# Patient Record
(demographics unavailable — no encounter records)

---

## 2018-03-19 NOTE — ER
Date of service - 3-19-18

 

HISTORY OF PRESENT ILLNESS:  An 84-year-old lady who comes in with her  
and son with

complaints of weakness and shortness of breath.  The patient states she has not 
felt good

all month, she just feels sick.  She feels very tired.  She does not think she 
has been

coughing up any kind of phlegm.  She denies any problems with wheezing and she 
does not

think she has been running a fever.  No other family members are currently sick.

 

PHYSICAL EXAMINATION:

GENERAL APPEARANCE:  The patient is awake and alert.  She is in mild 
respiratory distress

initially.  This seems to happen with any kind of movement or talking.  VITAL 
SIGNS:

Reviewed.  Initial blood pressure is 130/52.  O2 sats are in the upper 70s on 
room air.  The

patient tells us that she normally runs in the high 80s at home.  She uses 
oxygen at night

only.  Pulse is 127.  Respirations 24.  HEENT:  Ears, TMs are dull.  Nares are 
patent.  Oral

mucous membranes are dry.  Tonsils are not enlarged or injected.  Pharynx not 
inflamed.

Neck:  Supple.  LUNGS:  Reveal reduced air exchange in both bases.  I do not 
hear any

wheezes or rales initially.  CARDIAC:  Heart sounds distinct without murmurs.  
ABDOMEN:

Soft, protuberant, nontender to palpation.  Bowel sounds are present.  SKIN:  
Warm and dry.

EXTREMITIES:  There is no lower extremity edema.

 

LABORATORY DATA:  Labs include a CBC showing a slightly elevated white count of 
11.7.  CMP

shows a BUN of 21, creatinine 1.02, GFR is 52, glucose 128, nonfasting.  BNP is 
3322.

 

INITIAL TREATMENT:  The patient was placed on oxygen, which did bring her sats 
up.  She was

mouth breathing, therefore, we put her on a non-rebreather mask and her sats 
jumped to 100%

on 5 L.  We weaned her down and she still maintained in the low 90s on 2 L.

 

INITIAL TREATMENT PLAN:  The patient is given a bolus of 500 mL of normal 
saline.  She was

given a DuoNeb treatment as well and she stated that she felt much better after 
this had

been done.  Chest x-ray was obtained.  I can see some mild pulmonary 
congestion.  X-ray

report is negative for any acute findings.

 

DIAGNOSES:

1. Congestive heart failure.

2. Weakness.

3. Mild dehydration.

 

TREATMENT PLAN:  The patient will be admitted for observation purposes.  She is 
here with

her  and son, they agree to do this.  The patient initially is reluctant 
to be

admitted, but does agree to do this.  She will be admitted to observation.

 

 

CRS/MODL

DD:  03/19/2018 12:03:18

DT:  03/19/2018 18:49:15

Job #:  [id number]

MTDD

## 2018-03-19 NOTE — CR
DATE OF SERVICE:  03/19/18

CLINICAL DATA:  SOB



PORTABLE CHEST:



Comparison is made to a prior exam dated 02/26/18.



The heart size is stable.  There is calcification of the aortic arch.  



There are mild interstitial changes throughout both lungs.  There are mild 
atelectatic changes in both lower lungs.  



No pneumothorax.  No pleural effusions.  No areas of consolidation.



[id number]
MTDD

## 2018-03-20 NOTE — PN
DATE OF VISIT:  03/20/2018

 

SUBJECTIVE:  The patient was admitted yesterday for CHF with mild dehydration and weakness.

She has been doing better over the course of her stay.  She was given her blood pressure

medications last evening and again this morning, which is closer than 24 hours apart and her

blood pressure this morning has dropped.  Recent blood pressure taken this morning is

105/59, pulse is 131, O2 sats are 92%.  The patient states that she feels fine.  She did not

need a neb treatment or inhaler during the night to help with her breathing.  She slept

well.  Appetite has been good.

 

OBJECTIVE:  GENERAL APPEARANCE:  The patient is awake and alert.  No obvious distress.

VITAL SIGNS:  Reviewed.  Again, blood pressure 105/59, pulse is around 130.  She is

afebrile.  LUNG:  The patient has end-expiratory wheezes in both upper lobes, but just

minimal and mildly reduced air exchange in both bases.  I do not hear any rales or rhonchi

at this time.  SKIN:  Warm and dry.

 

TREATMENT PLAN:  The patient will be watched here for one more day.  I am little concerned

about her blood pressure being low.  She got her medications too close together with the

dosing in process and this puts her blood pressure down.  She also will be given Lasix just

once today, which she already has received this morning, 20 mg IV.

 

I was contacted an hour later.  The patient's blood pressure had dropped further to 70/40

and she was slightly dizzy.  Normal saline was started at 250 an hour.  We will give her 1 L

to help compensate for the low blood pressure and the patient is instructed to stay in bed

except for reducing the commode when she needs to do this with assistance.  The patient

states that she feels fine, just a little dizzy.  She is not having any problems breathing.

 

 

CRS/MODL

DD:  03/20/2018 10:23:50

DT:  03/20/2018 12:09:42

Job #:  [id number]

## 2018-03-20 NOTE — HP
HISTORY OF PRESENT ILLNESS:  This patient is being admitted for observation due to an

exacerbation of CHF, which is also causing weakness and she has mild dehydration symptoms.

The patient was seen in the emergency room with complaints of not feeling well for at least

3 weeks, feeling weak and sick.  She is tired.  Lab work revealed a white count of 11.7.

Kidney function was okay with a creatinine of 1.02, BUN 21.  BNP is 3,332.  UA was also

obtained through the emergency room, showing a small amount of proteinuria, 75 to 100 wbc's,

and a few bacteria.  The patient's vital signs have been okay.  Blood pressure initially

130/52, pulse 108, she is afebrile.  O2 saturations were initially in the low 70s, but with

oxygen applied, she has recovered into the upper 90s.  The oxygen was then turned down to 2

L, and she maintained a saturation of 92% with this.

 

PAST MEDICAL HISTORY:

1. Hypertension.

2. History of hiatal hernia.

3. History of seasonal allergies.

4. COPD.

 

PAST SURGICAL HISTORY:

1. Carotid endarterectomy.

2. Ankle fracture in the past, which did require surgery.

 

SOCIAL HISTORY:  The patient is .  She is still living at home with her .  She

is a former smoker, telling me that she quit years ago, but the damage was to some degree

done.

 

OBJECTIVE:  GENERAL APPEARANCE:  While seeing the patient a few hours after being admitted

to the hospital, she states that she is definitely feeling better.  She has had Lasix 20 mg

IV.  She has voided 1200 mL out using a bedside commode, which she has been doing well with.

Her O2 saturations are now stable, even with using the bedside commode with 2 L of oxygen

per nasal cannula.  LUNGS:  Reveal ongoing reduction of air exchange in both bases.  I do

not hear any rales, wheezes, or rhonchi at this time.  SKIN:  Warm and dry.  CARDIAC:  Heart

sounds distinct without murmurs.  EXTREMITIES:  The patient does not have any lower

extremity edema.

 

ASSESSMENT AND PLAN:  She will be here for observation at least 1 day.  She will be given

Lasix 20 mg b.i.d. starting out in IV fashion.  We will see how she does tomorrow and maybe

can switch her over to p.o. at that time.  Her IV fluids are running at 30 mL an hour.  We

did get an EKG and troponin level

today, which were also negative.  If the patient does continue to improve, there is a

reasonable chance she will be able to be discharged tomorrow.

 

 

TOLU/MODL

DD:  03/19/2018 17:59:58

DT:  03/20/2018 01:20:56

Job #:  [id number]

## 2018-03-21 NOTE — PCM.PN
- General Info


Date of Service: 03/21/18


Subjective Update: 


Pt claims that she has been feeling better today. She feels more energetic 

today. Has not been short of breath. No fever or chills. No cough or chest 

tightness. Tolerating diet well.She is on oxygen 2-4 litres/min.





Functional Status: Reports: Pain Controlled, Tolerating Diet, Ambulating, 

Urinating, Incentive Spirometry





- Review of Systems


General: Denies: Fever, Weakness, Fatigue, Malaise


HEENT: Denies: Sinus Congestion, Sore Throat, Rhinitis


Pulmonary: Denies: Shortness of Breath, Pleuritic Chest Pain, Cough, Sputum, 

Wheezing


Cardiovascular: Denies: Chest Pain, Lightheadedness


Gastrointestinal: Denies: Abdominal Pain, Nausea, Vomiting


Genitourinary: Denies: Dysuria, Frequency


Musculoskeletal: Denies: Joint Pain, Joint Swelling


Skin: Denies: Bruising, Pruritis, Rash


Neurological: Denies: Confusion, Headache





- Patient Data


Vitals - Most Recent: 


 Last Vital Signs











Temp  96.8 F   03/21/18 04:00


 


Pulse  118 H  03/21/18 04:00


 


Resp  18   03/21/18 04:00


 


BP  106/65   03/21/18 08:48


 


Pulse Ox  85 L  03/21/18 04:00











Weight - Most Recent: 74.843 kg


I&O - Last 24 Hours: 


 Intake & Output











 03/20/18 03/21/18 03/21/18





 22:59 06:59 14:59


 


Intake Total  100 


 


Output Total  350 


 


Balance  -250 











Lab Results Last 24 Hours: 


 Laboratory Results - last 24 hr











  03/21/18 Range/Units





  07:15 


 


Sodium  138  (136-145)  mmol/L


 


Potassium  4.5  (3.5-5.1)  mmol/L


 


Chloride  100  ()  mmol/L


 


Carbon Dioxide  26.8  (21.0-32.0)  mmol/L


 


Anion Gap  15.7 H  (5.0-15.0)  mmol/L


 


BUN  25  (8-26)  mg/dL


 


Creatinine  1.08 H  (0.55-1.02)  mg/dL


 


Est Cr Clr Drug Dosing  36.30  mL/min


 


Estimated GFR (MDRD)  48 L  (>60)  MLS/MIN


 


BUN/Creatinine Ratio  23.1  (6-25)  


 


Glucose  203 H D  ()  mg/dL


 


Calcium  8.5  (8.5-10.1)  mg/dL


 


Total Bilirubin  0.6  D  (0.0-1.0)  mg/dL


 


AST  14 L  (15-37)  U/L


 


ALT  13  (12-78)  U/L


 


Alkaline Phosphatase  74  ()  U/L


 


B-Natriuretic Peptide  809 H D  (0-450)  pg/mL


 


Total Protein  6.4  (6.4-8.2)  g/dL


 


Albumin  2.5 L  (3.4-5.0)  g/dL


 


Globulin  3.9  (2.2-4.2)  g/dL


 


Albumin/Globulin Ratio  0.6 L  (0.8-2.0)  











Donn Results Last 24 Hours: 


 Microbiology











 03/19/18 12:42 MRSA Surveillance Culture - Final





 Nasal, Unspecified    NO MRSA ISOLATED











Med Orders - Current: 


 Current Medications





Albuterol/Ipratropium (Duoneb 3.0-0.5 Mg/3 Ml)  3 ml NEB Q6H PRN


   PRN Reason: Shortness of Breath


   Stop: 03/21/18 12:00


   Last Admin: 03/21/18 07:50 Dose:  3 ml


Enalapril Maleate (Vasotec)  2.5 mg PO DAILY [facility]


   Last Admin: 03/21/18 08:48 Dose:  2.5 mg


Furosemide (Lasix)  20 mg PO DAILY [facility]


   Last Admin: 03/21/18 08:44 Dose:  Not Given


Sodium Chloride (Normal Saline)  1,000 mls @ 30 mls/hr IV ASDIRECTED [facility]


   Last Admin: 03/19/18 23:29 Dose:  30 mls/hr


Montelukast Sodium (Singulair)  10 mg PO QPM [facility]


Non-Formulary Medication (Acetaminophen [Tylenol Arthritis])  2 tab PO BID [facility]


   Last Admin: 03/21/18 07:48 Dose:  Not Given


Non-Formulary Medication (Aspirin [Melchor Chewable Aspirin])  81 mg PO DAILY [facility]


   Last Admin: 03/21/18 07:47 Dose:  81 mg


Non-Formulary Medication (Atorvastatin Calcium [Atorvastatin Calcium])  10 mg 

PO QPM [facility]


   Last Admin: 03/20/18 19:40 Dose:  Not Given


Non-Formulary Medication (Fluticasone Propionate [Flonase Allergy Relief])  1 - 

2 spray NASBOTH DAILY [facility]


   Stop: 03/23/18 08:01


   Last Admin: 03/21/18 07:48 Dose:  Not Given


Non-Formulary Medication (Fluticasone/Salmeterol [Advair 250-50 Diskus])  1 

puff INH BID [facility]


   Last Admin: 03/21/18 07:48 Dose:  1 puff


Atenolol 25mg Tabs  1 each PO DAILY [facility]


   Last Admin: 03/21/18 08:48 Dose:  1 each


Non-Formulary Medication (Albuterol [Ventolin Hfa])  1 - 2 puff INH Q4HR PRN


   PRN Reason: Shortness of Breath


   Stop: 03/21/18 18:00


Non-Formulary Medication (Lidocaine 5% [Lidoderm 5%])  1 patch TOP DAILY [facility]


Ondansetron HCl (Zofran Odt)  4 mg PO Q6H PRN


   PRN Reason: Nausea/Vomiting


Pramipexole Dihydrochloride (Mirapex)  0.125 mg PO QPM [facility]


   Last Admin: 03/21/18 01:17 Dose:  Not Given


Ranitidine HCl (Zantac)  300 mg PO BID [facility]


   Last Admin: 03/21/18 07:47 Dose:  300 mg


Tiotropium Bromide (Spiriva Handihaler)  18 mcg INH BEDTIME [facility]


   Last Admin: 03/20/18 19:39 Dose:  1 tab





Discontinued Medications





Albuterol/Ipratropium (Duoneb 3.0-0.5 Mg/3 Ml)  3 ml NEB ONETIME ONE


   Stop: 03/19/18 09:46


   Last Admin: 03/19/18 14:20 Dose:  Not Given


Furosemide (Lasix)  20 mg IVPUSH BID [facility]


   Last Admin: 03/19/18 12:30 Dose:  20 mg


Furosemide (Lasix)  20 mg IVPUSH BID [facility]


   Stop: 03/20/18 12:00


Furosemide (Lasix) Confirm Administered Dose 20 mg .ROUTE .STK-MED ONE


   Stop: 03/19/18 12:25


   Last Admin: 03/19/18 12:30 Dose:  Not Given


Furosemide (Lasix)  20 mg IVPUSH BID@0800,1400 [facility]


   Last Admin: 03/20/18 14:54 Dose:  Not Given


Sodium Chloride (Normal Saline)  500 mls @ 500 mls/hr IV .BOLUS ONE


   Stop: 03/19/18 10:39


   Last Admin: 03/19/18 09:42 Dose:  500 mls/hr


Methylprednisolone Sodium Succinate (Solu-Medrol)  80 mg IVPUSH ONETIME ONE


   Stop: 03/20/18 18:44


   Last Admin: 03/20/18 19:27 Dose:  80 mg


Non-Formulary Medication (Amlodipine Besylate/Benazepril [Amlodipine-Benazepril 

5-20 Mg])  1 cap PO DAILY [facility]


   Last Admin: 03/20/18 08:54 Dose:  1 cap


Non-Formulary Medication (Ranitidine Hcl [Zantac])  300 mg PO DAILY [facility]


Ranitidine HCl (Zantac)  300 mg PO DAILY [facility]


   Last Admin: 03/20/18 08:54 Dose:  300 mg











- Exam


Quality Assessment: Supplemental Oxygen


General: Alert, Oriented


HEENT: Pupils Equal, Pupils Reactive, EOMI, Mucous Membr. Moist/Pink


Neck: Supple


Lungs: Clear to Auscultation, Normal Respiratory Effort


Cardiovascular: Regular Rate, Regular Rhythm


GI/Abdominal Exam: Normal Bowel Sounds, Soft, Non-Tender, No Organomegaly, No 

Distention, No Abnormal Bruit, No Mass, Pelvis Stable


Extremities: Normal Inspection, Normal Range of Motion, Non-Tender, No Pedal 

Edema, Normal Capillary Refill


Skin: Warm, Intact





- Problem List & Annotations


(1) Congestive heart failure


SNOMED Code(s): 61630843


   Code(s): I50.9 - HEART FAILURE, UNSPECIFIED   Status: Acute   Current Visit: 

Yes   Onset Date: ~03/19/18   Annotation/Comment:: 3/19/18 - 1. Congestive 

heart failure, 2. Weakness, 3. Mild dehydration   





- Problem List Review


Problem List Initiated/Reviewed/Updated: Yes





- My Orders


Last 24 Hours: 


CHF improving








- Assessment


Assessment:: 


Pt has been clinically feeling better. No weakness or shortness of breath. 

Tolerating diet. Also her BNP is down form 3300 to 800s today.No lower 

extremity edema and lung are clear to auscultation. Her Morning BP is in 106/

65mmhg, will hold of her lasix. Pt will need ECHO cardiogram scheduled. Will 

try to titrate oxygen down and discontinue during day time and see, if she is 

able to do her ADLS. PT does uses oxygen at bedtime which we will continue.

## 2018-03-21 NOTE — PCM.DCSUM1
**Discharge Summary





- Hospital Course


Free Text/Narrative:: 


Pt was admitted with COPD exacerbation with new onset CHF. Pt's BNP was 3300. 

She was started on vasotec 2.5mg daily and also on lasix 20mg BID. Her COPD was 

treated with solumedrol. Pt had some improvement with her shortness of breath 

by day 1, but still having exertional dyspnea. Pt was closely monitored. She 

SpO2 did drop with day time  exertion. On day 2 Pt was feeling better. More 

energetic. Tolerating diet well. Her breathing had improved. Also her BNP was 

down from 3330 to 809. Renal function were stable. Clinically lungs were clear 

and also had no lower extremity edema. Pt did receive solumedrol 125mg IV and 

also pulmicort nebulizer. By the end of the day, pt was able to walk about good 

100yards before she got dyspnec, which had improved. Pt does not move more than 

that distance at home at a time. Pt appears to be at her baseline.





Will have her ECHO done next wednesday. Will continue home meds. I have 

discontinue her lotrel and kept her on vasotec 2.5mg. Also started her on 

tapering dose of prednsione.


Pt to followup with Dr. Deras on 3/29/18.





Brief History: Pt was admitted with SOB and was daignosed with COPD 

exacerbation with new onset CHF. Kindly see H&P for details





- Discharge Data


Discharge Date: 03/21/18


Discharge Disposition: Home, Self-Care 01


Condition: Good





- Discharge Diagnosis/Problem(s)


(1) Congestive heart failure


SNOMED Code(s): 04796252


   ICD Code: I50.9 - HEART FAILURE, UNSPECIFIED   Status: Acute   Current Visit

: Yes   Onset Date: ~03/19/18   Problem Details: 3/19/18 - 1. Congestive heart 

failure, 2. Weakness, 3. Mild dehydration   





- Patient Instructions


Diet: Low Sodium


Fluid Restriction: 1500 mL


Activity: As Tolerated


Showering/Bathing: May Shower





- Discharge Plan


Home Medications: 


 Home Meds





Acetaminophen [Tylenol Arthritis] 2 tab PO BID 01/14/17 [History]


Albuterol [Ventolin HFA] 1 - 2 puff INH Q4HR PRN 01/14/17 [History]


Aspirin [Melchor Chewable Aspirin] 81 mg PO DAILY 01/14/17 [History]


Atenolol [Tenormin] 50 mg PO DAILY 01/14/17 [History]


Fluticasone Propionate [Flonase Allergy Relief] 1 - 2 spray NASBOTH DAILY 01/14/ 17 [History]


Fluticasone/Salmeterol [Advair 250-50 Diskus] 1 puff INH BID 01/14/17 [History]


Lidocaine 5% [Lidoderm 5%] 1 patch TOP DAILY 01/14/17 [History]


Montelukast Sodium [Singulair] 10 mg PO QPM 01/14/17 [History]


Pramipexole Di-HCl [Pramipexole Dihydrochloride] 0.125 mg PO QPM 01/14/17 [

History]


Tiotropium Bromide [Spiriva] 1 inh INH DAILY 01/14/17 [History]


atorvaSTATin Calcium [Atorvastatin Calcium] 10 mg PO QPM 01/14/17 [History]


Ranitidine HCl [Zantac] 300 mg PO DAILY 03/19/18 [History]


Fluticasone/Salmeterol [Advair 250-50 Diskus] 1 puff INH BID 03/21/18 [Rx]


Ondansetron [Zofran ODT] 4 mg PO Q6H PRN  tab.dis 03/21/18 [Rx]


Tiotropium [Spiriva HandiHaler] 18 mcg INH BEDTIME  cap 03/21/18 [Rx]








Patient Handouts:  Heart-Healthy Eating Plan, Easy-to-Read, Chronic Obstructive 

Pulmonary Disease, Easy-to-Read, Home Oxygen Use, Adult, Low-Sodium Eating Plan

, Heart Failure, Easy-to-Read


Forms:  Take Home DC Nutrition Plan


Referrals: 


Cris Monte, NP [Primary Care Provider] - 





- Discharge Summary/Plan Comment


DC Time >30 min.: Yes


Discharge Summary/Plan Comment: 


Will have her ECHO done next wednesday. Will continue home meds. I have 

discontinue her lotrel and kept her on vasotec 2.5mg. Also started her on 

tapering dose of prednsione.


Pt to followup with Dr. Deras on 3/29/18.











- General Info


Date of Service: 03/21/18


Functional Status: Reports: Tolerating Diet, Ambulating, Urinating, Incentive 

Spirometry





- Review of Systems


General: Denies: Fever, Weakness


HEENT: Denies: Rhinitis, Visual Changes


Pulmonary: Denies: Shortness of Breath, Pleuritic Chest Pain, Cough, Sputum, 

Hemoptysis


Cardiovascular: Denies: Chest Pain, Lightheadedness


Gastrointestinal: Denies: Decreased Appetite, Nausea, Vomiting


Genitourinary: Denies: Dysuria, Frequency


Musculoskeletal: Denies: Joint Pain, Joint Swelling


Skin: Denies: Pruritis, Rash


Neurological: Denies: Confusion, Headache





- Patient Data


Vitals - Most Recent: 


 Last Vital Signs











Temp  98.2 F   03/21/18 12:00


 


Pulse  98   03/21/18 13:00


 


Resp  18   03/21/18 12:00


 


BP  98/61   03/21/18 13:00


 


Pulse Ox  94 L  03/21/18 12:00











Weight - Most Recent: 74.843 kg


I&O - Last 24 hours: 


 Intake & Output











 03/21/18 03/21/18 03/21/18





 06:59 14:59 22:59


 


Intake Total 100  


 


Output Total 350  


 


Balance -250  











Lab Results - Last 24 hrs: 


 Laboratory Results - last 24 hr











  03/21/18 03/21/18 Range/Units





  07:15 07:15 


 


WBC   4.8  D  (4.0-11.0)  K/uL


 


RBC   3.90  (3.80-5.80)  M/uL


 


Hgb   11.3 L  (11.5-16.5)  g/dL


 


Hct   36.2 L  (37.0-47.0)  %


 


MCV   93  (76-96)  fL


 


MCH   29.0  (27.0-32.0)  pg


 


MCHC   31.2  (31.0-35.0)  g/dL


 


RDW   15.5  (11.0-16.0)  %


 


Plt Count   75 L D  (150-500)  K/uL


 


MPV   11.2 H  (6.0-10.0)  fL


 


Neut % (Auto)   90.0 H  (45.0-70.0)  %


 


Lymph % (Auto)   7.7 L  (20.0-40.0)  %


 


Mono % (Auto)   2.1 L  (3.0-10.0)  %


 


Eos % (Auto)   0.2 L  (1.0-5.0)  %


 


Baso % (Auto)   0.0  (0.0-0.5)  %


 


Neut # (Auto)   4.30  (2.00-7.50)  K/uL


 


Lymph # (Auto)   0.37 L  (1.50-4.00)  K/uL


 


Mono # (Auto)   0.10 L  (0.20-0.80)  K/uL


 


Eos # (Auto)   0.01 L  (0.04-0.40)  K/uL


 


Baso # (Auto)   0.00 L  (0.02-0.10)  K/uL


 


Sodium  138   (136-145)  mmol/L


 


Potassium  4.5   (3.5-5.1)  mmol/L


 


Chloride  100   ()  mmol/L


 


Carbon Dioxide  26.8   (21.0-32.0)  mmol/L


 


Anion Gap  15.7 H   (5.0-15.0)  mmol/L


 


BUN  25   (8-26)  mg/dL


 


Creatinine  1.08 H   (0.55-1.02)  mg/dL


 


Est Cr Clr Drug Dosing  36.30   mL/min


 


Estimated GFR (MDRD)  48 L   (>60)  MLS/MIN


 


BUN/Creatinine Ratio  23.1   (6-25)  


 


Glucose  203 H D   ()  mg/dL


 


Calcium  8.5   (8.5-10.1)  mg/dL


 


Total Bilirubin  0.6  D   (0.0-1.0)  mg/dL


 


AST  14 L   (15-37)  U/L


 


ALT  13   (12-78)  U/L


 


Alkaline Phosphatase  74   ()  U/L


 


B-Natriuretic Peptide  809 H D   (0-450)  pg/mL


 


Total Protein  6.4   (6.4-8.2)  g/dL


 


Albumin  2.5 L   (3.4-5.0)  g/dL


 


Globulin  3.9   (2.2-4.2)  g/dL


 


Albumin/Globulin Ratio  0.6 L   (0.8-2.0)  











Med Orders - Current: 


 Current Medications





Enalapril Maleate (Vasotec)  2.5 mg PO DAILY [facility]


   Last Admin: 03/21/18 08:48 Dose:  2.5 mg


Furosemide (Lasix)  20 mg PO DAILY [facility]


   Last Admin: 03/21/18 08:44 Dose:  Not Given


Sodium Chloride (Normal Saline)  1,000 mls @ 30 mls/hr IV ASDIRECTED [facility]


   Last Admin: 03/19/18 23:29 Dose:  30 mls/hr


Montelukast Sodium (Singulair)  10 mg PO QPM [facility]


Non-Formulary Medication (Acetaminophen [Tylenol Arthritis])  2 tab PO BID [facility]


   Last Admin: 03/21/18 07:48 Dose:  Not Given


Non-Formulary Medication (Aspirin [Melchor Chewable Aspirin])  81 mg PO DAILY [facility]


   Last Admin: 03/21/18 07:47 Dose:  81 mg


Non-Formulary Medication (Atorvastatin Calcium [Atorvastatin Calcium])  10 mg 

PO QPM [facility]


   Last Admin: 03/20/18 19:40 Dose:  Not Given


Non-Formulary Medication (Fluticasone Propionate [Flonase Allergy Relief])  1 - 

2 spray NASBOTH DAILY [facility]


   Stop: 03/23/18 08:01


   Last Admin: 03/21/18 07:48 Dose:  Not Given


Non-Formulary Medication (Fluticasone/Salmeterol [Advair 250-50 Diskus])  1 

puff INH BID [facility]


   Last Admin: 03/21/18 07:48 Dose:  1 puff


Atenolol 25mg Tabs  1 each PO DAILY [facility]


   Last Admin: 03/21/18 08:48 Dose:  1 each


Non-Formulary Medication (Albuterol [Ventolin Hfa])  1 - 2 puff INH Q4HR PRN


   PRN Reason: Shortness of Breath


   Stop: 03/21/18 18:00


Non-Formulary Medication (Lidocaine 5% [Lidoderm 5%])  1 patch TOP DAILY [facility]


Ondansetron HCl (Zofran Odt)  4 mg PO Q6H PRN


   PRN Reason: Nausea/Vomiting


Pramipexole Dihydrochloride (Mirapex)  0.125 mg PO QPM [facility]


   Last Admin: 03/21/18 01:17 Dose:  Not Given


Ranitidine HCl (Zantac)  300 mg PO BID [facility]


   Last Admin: 03/21/18 07:47 Dose:  300 mg


Tiotropium Bromide (Spiriva Handihaler)  18 mcg INH BEDTIME [facility]


   Last Admin: 03/20/18 19:39 Dose:  1 tab





Discontinued Medications





Albuterol/Ipratropium (Duoneb 3.0-0.5 Mg/3 Ml)  3 ml NEB ONETIME ONE


   Stop: 03/19/18 09:46


   Last Admin: 03/19/18 14:20 Dose:  Not Given


Albuterol/Ipratropium (Duoneb 3.0-0.5 Mg/3 Ml)  3 ml NEB Q6H PRN


   PRN Reason: Shortness of Breath


   Stop: 03/21/18 12:00


   Last Admin: 03/21/18 07:50 Dose:  3 ml


Budesonide (Pulmicort)  0.5 mg NEB ONETIME ONE


   Stop: 03/21/18 14:05


   Last Admin: 03/21/18 14:15 Dose:  0.5 mg


Furosemide (Lasix)  20 mg IVPUSH BID [facility]


   Last Admin: 03/19/18 12:30 Dose:  20 mg


Furosemide (Lasix)  20 mg IVPUSH BID [facility]


   Stop: 03/20/18 12:00


Furosemide (Lasix) Confirm Administered Dose 20 mg .ROUTE .STK-MED ONE


   Stop: 03/19/18 12:25


   Last Admin: 03/19/18 12:30 Dose:  Not Given


Furosemide (Lasix)  20 mg IVPUSH BID@0800,1400 [facility]


   Last Admin: 03/20/18 14:54 Dose:  Not Given


Sodium Chloride (Normal Saline)  500 mls @ 500 mls/hr IV .BOLUS ONE


   Stop: 03/19/18 10:39


   Last Admin: 03/19/18 09:42 Dose:  500 mls/hr


Methylprednisolone Sodium Succinate (Solu-Medrol)  80 mg IVPUSH ONETIME ONE


   Stop: 03/20/18 18:44


   Last Admin: 03/20/18 19:27 Dose:  80 mg


Methylprednisolone Sodium Succinate (Solu-Medrol)  125 mg IVPUSH ONETIME ONE


   Stop: 03/21/18 14:05


   Last Admin: 03/21/18 14:28 Dose:  125 mg


Non-Formulary Medication (Amlodipine Besylate/Benazepril [Amlodipine-Benazepril 

5-20 Mg])  1 cap PO DAILY [facility]


   Last Admin: 03/20/18 08:54 Dose:  1 cap


Non-Formulary Medication (Ranitidine Hcl [Zantac])  300 mg PO DAILY [facility]


Ranitidine HCl (Zantac)  300 mg PO DAILY [facility]


   Last Admin: 03/20/18 08:54 Dose:  300 mg











- Exam


Quality Assessment: Reports: Supplemental Oxygen


General: Reports: Alert, Oriented


HEENT: Reports: Pupils Equal, Pupils Reactive, EOMI, Mucous Membr. Moist/Pink


Neck: Reports: Supple


Lungs: Reports: Clear to Auscultation, Normal Respiratory Effort


Cardiovascular: Reports: Regular Rate, Regular Rhythm


GI/Abdominal Exam: Normal Bowel Sounds, Soft, Non-Tender, No Organomegaly, No 

Distention, No Abnormal Bruit, No Mass, Pelvis Stable


Back Exam: Reports: Normal Inspection, Full Range of Motion


Extremities: Normal Inspection, Normal Range of Motion, Non-Tender, No Pedal 

Edema, Normal Capillary Refill


Skin: Reports: Warm, Intact


Psy/Mental Status: Reports: Alert, Normal Mood





*Q Meaningful Use (DIS)





- VTE *Q


VTE Criteria *Q: 








- Stroke *Q


Stroke Criteria *Q: 








- AMI *Q


AMI Criteria *Q:

## 2019-11-01 NOTE — ER
HISTORY OF PRESENT ILLNESS:  An 85-year-old female who comes in by ambulance with complaints

of shortness of breath and shaking.  She stated that this just happened this evening about

56 p.m.  The patient was hypoxic at that time.  The family members here state that she did

go to the bathroom, and just that amount of walking made her symptomatic.  She has been this

way before, but it did not go away this time.  When she went back to bed, the symptoms

continued and they noticed that her oxygen level was low down as low as the low 80s or high

70s at home.  They called the ambulance, and upon arrival at the ER her O2 sats had gone up

to the low 90s with oxygen on at 2 L.  Nursing staff has since increased it to 3 L.  When I

saw the patient, she states that she is feeling much better, she has warm blankets on.  She

states that she is no longer shaking and is not short of breath.  She has been taking her

medication as directed, and she does have oxygen that she uses at home all the time.  Her

family members tell me that she is good at using it.

 

PAST MEDICAL HISTORY:  CHF and COPD, mild dehydration and weakness, and colitis.

 

OBJECTIVE:  GENERAL APPEARANCE:  The patient is awake and alert.  She is in no respiratory

distress at this time.  VITAL SIGNS:  Reviewed.  Initial temp is 97.1, pulse is 106, blood

pressure is 140/83, respiratory rate is 20, and O2 sat is 92% on 3 L.  HEENT:  Oral mucous

membranes are slightly dry.  Tonsils are not enlarged or injected.  Pharynx not inflamed.

NECK:  Supple.  LUNGS:  Mostly clear on initial evaluation.  There is a little bit of

rhonchi in both bases at the endpoints of expiration.  CARDIAC:  Heart sounds distinct.  No

obvious murmurs noted.  ABDOMEN:  Soft and nontender.  SKIN:  Warm and dry.  EXTREMITIES:

There is no lower extremity edema noted.

 

LAB AND X-RAY STUDIES:  Chest x-ray is obtained showing what appears to be some atelectasis

in the left lower lobe and a small amount of fluid in this area as well, slightly more

significant than the patient's most recent chest x-ray, which is over a year old, which is

read as atelectasis.

 

LABORATORY DATA:  Today include a CBC showing an elevated neutrophil count.  White count is

10.9.  Viral markers are low.  Comprehensive metabolic panel shows a BUN of 29, creatinine

of 1.0.  BNP is 703, slightly elevated.  At this point, I re-evaluated the patient.

 

Second set of vitals reveals blood pressure has dropped down to 101/43, the other results

are about the same, O2 sats remained at 92%.  Lung exam reveals more pronounced rhonchi with

a small amount of rales scattered throughout the lung fields at this time.  The patient is

not in any respiratory distress.  She has an occasional cough.

 

DIAGNOSIS:

1. Bronchitis.

2. Mild congestive heart failure.

 

TREATMENT PLAN:  I will start the patient on a Z-Patrick.  She will also be started on Lasix 20

mg a day to start this tomorrow morning, taking it once in the morning.  She is to go home

and rest and keep her oxygen on, and she is to be monitored closely at home.  She is stable.

I do want the patient to follow up next week with her primary care provider in the clinic

for a recheck.  Followup should be sooner as needed.

 

 

TOLU/LETTY

DD:  11/01/2019 20:50:32

DT:  11/01/2019 23:00:33

Job #:  [id number]

## 2019-11-02 NOTE — CR
CLINICAL DATA:  SOB.



AP PORTABLE CHEST, 1 NOVEMBER 2019:



Comparison made to a prior exam dated March 19, 2018.



The heart size is stable.  There is calcification of the aortic arch. 



The pulmonary vasculature is mildly prominent, suggesting mild pulmonary venous 
congestion.  There is chronic eventration of the right hemidiaphragm.  The 
lungs are clear.  



No other significant findings.  



Job:  [id number]
MTDD

## 2021-01-03 NOTE — CT
Date of Service:  01/02/21

Clinical Data:  abdominal pain



ABDOMEN AND PELVIC CT:  



Multislice acquisition through the abdomen and pelvis without IV, but with oral 
contrast was performed.  



Comparison is made to a prior exam dated 01/14/17.  



There are mild emphysematous changes in both lower lungs.  There are atelectasis
changes within the right middle lobe and lingular segment of the left upper lobe
and in both lung bases.  The lung bases are otherwise clear.  



There is a large hiatal hernia.  



The heart size is normal.  There are moderate coronary artery calcifications.  
There are calcifications in the region of the aortic and mitral valves.  No 
pericardial effusion.  



The unenhanced liver appears normal.  No focal hepatic lesions.  The gallbladder
is mildly distended.  No calcified gallstones.  No pericholecystic fluid.  



The spleen appears normal.  The pancreas is atrophic, otherwise unremarkable.  



The right and left adrenals appear normal.  



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  The right
and left kidneys otherwise appear unremarkable.  No nephrocalcinosis or 
nephrolithiasis.  No hydronephrosis or hydroureter.  



There is Morgan catheter within the bladder and the bladder is decompressed.  



There is gas and fluid throughout the colon with scattered air-fluid levels.  
There are also scattered air-fluid levels within the small bowel.  No distended 
loops of small bowel.  Enterocolitis should be considered.  Followup imaging is 
recommended if clinically indicated.  



There is a 3.3 cm abdominal aortic aneurysm.  It is unchanged from the prior 
exam.  There are stenotic changes involving both iliac and femoral arteries.  



No free air.  No free fluid.  No adenopathy.  



There is a small fat-containing umbilical hernia.  



No other significant findings.  



[id number]

[facility]

## 2021-01-03 NOTE — CR
Date of Service:  01/02/21

Clinical Data:  abdominal pain



AP CHEST:  



Comparison is made to a prior exam dated 11/01/19.  



The heart size is normal.  The aorta is calcified and ectatic.  



There is eventration of the right hemidiaphragm.  There is increased density in 
the right lung base consistent with basilar atelectasis or infiltrate.  
Pneumonia should be considered.  



The lungs are otherwise clear.  No pneumothorax.  No pleural effusions.  



[id number]

[facility]

## 2021-01-03 NOTE — CR
Date of Service:  01/03/21

Clinical Data:  Abdominal Pain



AP CHEST:  



Comparison is made to a prior exam dated 01/02/21.  



There is persistent eventration of the right hemidiaphragm with increased 
density in the right lung base consistent with basilar atelectasis or 
infiltrate.  



The pulmonary vasculature does appear slightly more prominent than on the prior 
exam.  Pulmonary venous congestion or fluid overload should be considered.  



The exam is otherwise unchanged from the prior.  



[id number]

[facility]

## 2021-01-03 NOTE — HP
EMERGENCY ROOM NOTE AND ADMISSION HISTORY AND PHYSICAL:

 

REASON FOR ADMISSION:  Vomiting, diarrhea, and dehydration.

 

HISTORY:  This is an 87-year-old woman who was brought in by her  with complaints of

abdominal pain, nausea, vomiting, and diarrhea.  She states that she does have a history of

CHF and COPD and has had Clostridium difficile colitis in the past.  She states that she was

feeling reasonably good yesterday, and this morning, however, at around 11 o'clock this

morning, she began to experience some mild nausea.  She went ahead and ate lunch and

afterwards vomited and vomited 3 or 4 times since then.  She states that she had a lot of

abdominal pain this afternoon with that has subsided somewhat.  She had 2 semiformed stools,

but shortly after arriving in the emergency room, she had voluminous diarrheal stool that

was yellowish in color.  Her diarrhea actually did not start until she arrived in the

emergency room this evening.  She denies any fever or chills.  She does state that she has

been coughing a bit more over the past 3 or 4 days, but she does have COPD and admits to

chronic cough albeit to a lesser extent under normal circumstances.  Her  states that

while she was sitting in a chair, she became pale and briefly appeared to be somewhat

unresponsive, but it is questionable as to whether or not she actually had a syncopal

episode or loss of consciousness.  In 2017, she had Clostridium difficile colitis, which

necessitated a fecal transplant at the [facility].  This evidently was

successful.  She has not had any problems since.  She has not been on any antibiotics

lately.  No one else in the family and immediate family has been ill lately.

 

PAST MEDICAL HISTORY:  Significant for CHF and COPD and Clostridium difficile colitis.

 

MEDICATIONS:  Reviewed.  Please see electronic medical record.  They include:

1. Atenolol.

2. Spiriva inhaler.

3. Omeprazole.

4. Advair inhaler.

5. One baby aspirin per day.

6. Acetaminophen p.r.n.

 

ALLERGIES:  TO AMOXICILLIN, PENICILLIN, AND MOLDS AND DUST.

 

 

REVIEW OF SYSTEMS:

Pertinent positives and negatives as listed in the HPI.  It should be noted that she is only

able to ambulate with the use of either a walker or wheelchair at home.  She is on home

oxygen on a chronic basis.

 

PHYSICAL EXAMINATION:

GENERAL:  She is alert and awake and does not appear to be in any acute distress.  VITAL

SIGNS:  Her blood pressure is 106/54, O2 sats 93% on 4 L of O2 by mask.  Heart rate is 97.

She is afebrile.  Respiratory rate is 24.  HEENT:  No scleral icterus is noted.  Her oral

mucosa is dry.  NECK:  Supple.  There is no JVD.  No bruits are heard.  CHEST:  Clear to

auscultation with perhaps some diminished breath sounds at the bases, but no wheezes or

rhonchi are noted.  CARDIAC:  Regular rate without murmur.  ABDOMEN:  Obese, mildly

distended.  It is soft and nontender, however, to deep palpation.  There is no rebound or

guarding.  No hepatosplenomegaly is noted.  No palpable masses.  EXTREMITIES:  She has no

palpable pulses below the groins.  However, her feet are reasonably pink and warm.  There is

no cyanosis.  She has trace edema in both ankles.  NEUROLOGIC EXAMINATION:  No facial

asymmetry.  She moves all 4 extremities.  Deep tendon reflexes and detailed neurologic exam

were not performed.

 

LABORATORY DATA:  Her CBC is unremarkable.  She has no leukocytosis or anemia.  Her CMP

shows that her sodium is 140 with a potassium of 5.6.  Her CO2 is 20.6 with a chloride of

104.  Her anion gap is elevated at 21.  Her BUN is 24 with a creatinine of 1.35.  Her

estimated GFR is decreased from her previous level to 37; one year ago, it was 53.  Her

glucose is 186.  Her bilirubin is normal, but she has borderline elevations in her AST and

alkaline phosphatase.  Urinalysis is unremarkable except her urine is concentrated with a

specific gravity of greater than 1.030.

 

Chest x-ray suggests possibly a mild infiltrate at the right lung base, otherwise it is

unremarkable.

 

We did go ahead and get a CT scan of her abdomen and this only showed a very small aortic

aneurysm (3.5 cm), but no acute intraabdominal pathology was noted.  This was done with oral

contrast.

 

COVID-19 rapid test was negative.

 

IMPRESSION:  Clinical picture of this is a gastroenteritis.  We have to exclude the

possibility of a Clostridium difficile colitis given her history and the lab is pending on

that.  We also obtained a stool culture.

 

IMPRESSION:  Gastroenteritis with dehydration, possible early pneumonia.

 

PLAN:  She will be admitted and rehydrated gingerly.  I will hold off on any antibiotics at

this point in time given the fact that a definite infiltrate was not demonstrated on her CT

scan, even though all this chest x-ray suggests one may be developing.  We will go ahead and

repeat a chest x-ray in the morning and we will hydrate her and keep her n.p.o. overnight

and observe her.  All questions were answered and they agree with my plan.  Her  was

present for part of this evaluation.

 

 

DEONDRE/LETTY

DD:  01/02/2021 18:55:32

DT:  01/02/2021 21:26:52

## 2021-01-04 NOTE — PCM.PN
- General Info


Date of Service: 01/04/21


Admission Dx/Problem (Free Text): 





pt admitted for weakness, dehydration, pneumonia





Subjective Update: 





pt states she feels improved from admit, and even this morning. states weakness 

has improved and no nausea since admit. pt does state shortness of breath on 

exertion which although has improved, still remains. denies cough, diaphoresis, 

fever, chills, abdominal pain. 


Functional Status: Reports: Pain Controlled, Tolerating Diet, Ambulating





- Review of Systems


General: Reports: Weakness


HEENT: Reports: No Symptoms


Pulmonary: Reports: Shortness of Breath


Cardiovascular: Reports: No Symptoms


Gastrointestinal: Reports: No Symptoms


Genitourinary: Reports: No Symptoms


Skin: Reports: No Symptoms


Neurological: Reports: No Symptoms





- Patient Data


Vitals - Most Recent: 


                                Last Vital Signs











Temp  96.7 F L  01/04/21 14:00


 


Pulse  91   01/04/21 14:00


 


Resp  18   01/04/21 14:00


 


BP  110/50 L  01/04/21 14:00


 


Pulse Ox  91 L  01/04/21 14:00











Weight - Most Recent: 165 lb


I&O - Last 24 Hours: 


                                 Intake & Output











 01/04/21 01/04/21 01/04/21





 06:59 14:59 22:59


 


Intake Total 500  


 


Output Total 900  


 


Balance -400  











Med Orders - Current: 


                               Current Medications





Acetaminophen (Tylenol Arthritis Pain)  1,300 mg PO TID PRN


   PRN Reason: Pain


   Last Admin: 01/04/21 09:11 Dose:  1,300 mg


   Documented by: 


Albuterol (Proventil Neb Soln)  2.5 mg NEB Q4H PRN


   PRN Reason: short of breath


Aspirin (Aspirin)  81 mg PO DAILY [facility]


   Last Admin: 01/04/21 08:20 Dose:  81 mg


   Documented by: 


Calamine/Phenol (Calmoseptine)  0 gm TOP QID PRN


   PRN Reason: Rash


   Last Admin: 01/03/21 10:12 Dose:  1 applic


   Documented by: 


Levofloxacin (Levaquin)  750 mg PO Q48H [facility]


Ondansetron HCl (Zofran)  4 mg IVPUSH Q6H PRN


   PRN Reason: Nausea/Vomiting


   Last Admin: 01/02/21 19:04 Dose:  4 mg


   Documented by: 


Pantoprazole Sodium (Protonix***)  40 mg PO ACBREAKFAST [facility]


   Last Admin: 01/04/21 08:20 Dose:  40 mg


   Documented by: 


Trolamine Salicylate (Aspercreme 10%)  0 gm TOP Q1H PRN


   PRN Reason: Other


   Last Admin: 01/03/21 14:13 Dose:  1 applic


   Documented by: 





Discontinued Medications





Acetaminophen (Tylenol Arthritis Pain)  1,300 mg PO TID [facility]


   Last Admin: 01/03/21 18:58 Dose:  Not Given


   Documented by: 


Albuterol/Ipratropium (Duoneb 3.0-0.5 Mg/3 Ml) Confirm Administered Dose 3 ml 

.ROUTE .STK-MED ONE


   Stop: 01/02/21 19:05


   Last Admin: 01/02/21 19:30 Dose:  Not Given


   Documented by: 


Albuterol/Ipratropium (Duoneb 3.0-0.5 Mg/3 Ml)  3 ml NEB Q2H ONE


   Stop: 01/02/21 19:01


   Last Admin: 01/02/21 19:00 Dose:  3 ml


   Documented by: 


Furosemide (Lasix)  20 mg IVPUSH ONETIME ONE


   Stop: 01/03/21 16:11


   Last Admin: 01/03/21 16:40 Dose:  20 mg


   Documented by: 


Sodium Chloride (Normal Saline)  1,000 mls @ 75 mls/hr IV ASDIRECTED [facility]


   Last Admin: 01/03/21 10:08 Dose:  100 mls/hr


   Documented by: 


Levofloxacin/Dextrose 750 mg/ (Levofloxacin/Dextrose)  300 mls @ 100 mls/hr IV 

Q24H [facility]


   Stop: 01/07/21 09:00


   Last Admin: 01/03/21 10:10 Dose:  100 mls/hr


   Documented by: 


Sodium Chloride (Normal Saline)  500 mls @ 100 mls/hr IV .BOLUS ONE


   Stop: 01/03/21 13:44


   Last Admin: 01/03/21 09:10 Dose:  500 mls/hr


   Documented by: 


Levofloxacin/Dextrose (Levaquin In D5w 750 Mg/150 Ml) Confirm Administered Dose 

150 mls @ as directed IV .STK-MED ONE


   Stop: 01/03/21 09:54


   Last Admin: 01/03/21 10:10 Dose:  Not Given


   Documented by: 


Sodium Chloride (Normal Saline)  1,000 mls @ 75 mls/hr IV ASDIRECTED [facility]


   Last Admin: 01/03/21 11:40 Dose:  75 mls/hr


   Documented by: 


Levofloxacin/Dextrose 750 mg/ (Levofloxacin/Dextrose)  300 mls @ 100 mls/hr IV 

Q48H [facility]


   Stop: 01/07/21 09:00


Loperamide HCl (Imodium)  4 mg PO ONETIME ONE


   Stop: 01/03/21 08:49


   Last Admin: 01/03/21 09:17 Dose:  4 mg


   Documented by: 


Ondansetron HCl (Zofran) Confirm Administered Dose 4 mg .ROUTE .STK-MED ONE


   Stop: 01/02/21 19:13


   Last Admin: 01/02/21 19:05 Dose:  Not Given


   Documented by: 











- Exam


General: Alert, Oriented, Cooperative


HEENT: Pupils Equal, Pupils Reactive, EOMI, Mucous Membr. Moist/Pink


Lungs: Rales (bilateral lower, R>L), Wheezing (left upper)


Cardiovascular: Regular Rate, Regular Rhythm, No Murmurs


GI/Abdominal Exam: Normal Bowel Sounds, Soft, Non-Tender


Extremities: Pedal Edema (+1bilateral)


Peripheral Pulses: 2+: Radial (L), Radial (R), Posterior Tibial (L), Posterior 

Tibial (R), Dorsalis Pedis (L), Dorsalis Pedis (R)


Skin: Warm, Dry, Intact


Neurological: No New Focal Deficit


Psy/Mental Status: Alert, Normal Affect, Normal Mood





Sepsis Event Note





- Evaluation


Sepsis Screening Result: No Definite Risk





- Focused Exam


Vital Signs: 


                                   Vital Signs











  Temp Pulse Resp BP BP Pulse Ox


 


 01/04/21 14:00  96.7 F L  91  18   110/50 L  91 L


 


 01/04/21 10:00  97.4 F  115 H  22 H   120/65  91 L


 


 01/04/21 06:00  97 F  97  14  118/71   97














- Problem List & Annotations


(1) Pneumonia


SNOMED Code(s): 397789504


   Code(s): J18.9 - PNEUMONIA, UNSPECIFIED ORGANISM   Status: Acute   Current 

Visit: Yes   


Qualifiers: 


   Lung location: unspecified part of lung 





(2) Congestive heart failure


SNOMED Code(s): 52045390


   Code(s): I50.9 - HEART FAILURE, UNSPECIFIED   Status: Acute   Current Visit: 

No   Onset Date: ~03/19/18   Annotation/Comment:: 3/19/18 - 1. Congestive heart 

failure, 2. Weakness, 3. Mild dehydration   





(3) Mild dehydration


SNOMED Code(s): 8844260184849


   Code(s): E86.0 - DEHYDRATION   Status: Acute   Current Visit: No   Onset 

Date: ~03/19/18   Annotation/Comment:: 3/19/18 - 1. Congestive heart failure, 2.

Weakness, 3. Mild dehydration   





(4) Weakness


SNOMED Code(s): 59228149


   Code(s): R53.1 - WEAKNESS   Status: Acute   Current Visit: No   Onset Date: 

~03/19/18   Annotation/Comment:: 3/19/18 - 1. Congestive heart failure, 2. 

Weakness, 3. Mild dehydration   





- Problem List Review


Problem List Initiated/Reviewed/Updated: Yes





- My Orders


Last 24 Hours: 


My Active Orders





01/04/21 15:16


RT Aerosol Therapy [RC] ASDIRECTED 





01/04/21 15:16


Albuterol [Proventil Neb Soln]   2.5 mg NEB Q4H PRN 





01/04/21 Dinner


Adult Diet [DIET] 





01/05/21 05:11


B-TYPE NATRIURETIC PEPTIDE,BNP [CHEM] AM 


COMPREHENSIVE METABOLIC PN,CMP [CHEM] AM 





01/05/21 09:00


levoFLOXacin [Levaquin]   750 mg PO Q48H 














- Plan


Plan:: 





repeat labs in AM: CMP and BNP


change ABX to PO tomorrow AM


DC cardiac monitor and Morgan catheter


albuterol nebs PRN


incentive spirometry





will reassess and likely DC home tomorrow if pt continues to improve.

## 2021-01-05 NOTE — PCM.DCSUM1
**Discharge Summary





- Hospital Course


Free Text/Narrative:: 





pt admitted with weakness and found to have pneumonia on obs day 1. continued 

weakness on obs day 2 and improved greatly since regarding activity tolerance, 

SOB, and general energy. incentive spirometry and PO med change on Obs day 2 

which pt has tolerated well. no further nausea, vomiting, diarrhea since.





- Discharge Data


Discharge Date: 01/05/21


Discharge Disposition: Home, Self-Care 01


Condition: Good





- Referral to Home Health


Primary Care Physician: 


PCP None








- Discharge Diagnosis/Problem(s)


(1) Pneumonia


SNOMED Code(s): 749703688


   ICD Code: J18.9 - PNEUMONIA, UNSPECIFIED ORGANISM   Status: Acute   Current 

Visit: Yes   


Qualifiers: 


   Pneumonia type: due to unspecified organism   Laterality: right   Lung 

location: lower lobe of lung   Qualified Code(s): J18.9 - Pneumonia, unspecified

organism   





(2) Congestive heart failure


SNOMED Code(s): 27821602


   ICD Code: I50.9 - HEART FAILURE, UNSPECIFIED   Status: Acute   Current Visit:

No   Onset Date: ~03/19/18   Problem Details: 3/19/18 - 1. Congestive heart 

failure, 2. Weakness, 3. Mild dehydration   


Qualifiers: 


   Heart failure type: systolic   Heart failure chronicity: chronic   Qualified 

Code(s): I50.22 - Chronic systolic (congestive) heart failure   





(3) Mild dehydration


SNOMED Code(s): 5241864648672


   ICD Code: E86.0 - DEHYDRATION   Status: Acute   Current Visit: Yes   Onset 

Date: ~03/19/18   Problem Details: 3/19/18 - 1. Congestive heart failure, 2. 

Weakness, 3. Mild dehydration   





(4) Weakness


SNOMED Code(s): 82129649


   ICD Code: R53.1 - WEAKNESS   Status: Acute   Current Visit: Yes   Onset Date:

~03/19/18   Problem Details: 3/19/18 - 1. Congestive heart failure, 2. Weakness,

3. Mild dehydration   





- Discharge Plan


*PRESCRIPTION DRUG MONITORING PROGRAM REVIEWED*: Not Applicable


*COPY OF PRESCRIPTION DRUG MONITORING REPORT IN PATIENT HEMAL: No


Prescriptions/Med Rec: 


Fluticasone/Salmeterol [Advair 100-50] 1 puff IH BID 30 Days #1 diskus


levoFLOXacin [Levaquin] 750 mg PO Q48H #3 tablet


Home Medications: 


                                    Home Meds





Acetaminophen [Tylenol Arthritis] 2 tab PO TID 01/14/17 [History]


Aspirin [Melchor Chewable Aspirin] 81 mg PO DAILY 01/14/17 [History]


atenoloL [Tenormin] 25 mg PO DAILY 01/14/17 [History]


Omeprazole 20 mg PO DAILY 09/11/19 [History]


atorvaSTATin [Lipitor] 10 mg PO BEDTIME 01/03/21 [History]


Fluticasone/Salmeterol [Advair 100-50] 1 puff IH BID 30 Days #1 diskus 01/05/21 

[Rx]


Furosemide [Lasix] 20 mg PO DAILY #30 tab 01/05/21 [Rx]


Tiotropium [Spiriva HandiHaler] 18 mcg INH DAILY #30 cap 01/05/21 [Rx]


levoFLOXacin [Levaquin] 750 mg PO Q48H #3 tablet 01/05/21 [Rx]








Oxygen Therapy Mode: Nasal Cannula


Oxygen Flow Rate (L/min): 2


Patient Handouts:  Levofloxacin tablets





- Discharge Summary/Plan Comment


DC Time >30 min.: Yes


Discharge Summary/Plan Comment: 





pt admitted to [facility] and found to have PNA of RLL coinciding with CHF 

exacerbation. 


weakness and activity tolerance improved. will discharge home on levaquin x3 

doses with dosing every 48hr as she was started on this due to reduced Creat 

clearance.


will also start on diuretics daily as her chf is likely causing reduced activity

 tolerance and fluid off loading will likely help this. 


discharge home and follow up in clinic in 1-2 weeks for recheck and return to 

home health.


refill of inhaler prescriptions as requested as pt is currently out.





- General Info


Date of Service: 01/05/21


Admission Dx/Problem (Free Text: 





pt admitted for weakness, dehydration, pneumonia





Subjective Update: 





pt states she feels improved from admit, and even this morning. states weakness 

has improved and no nausea since admit. pt does state shortness of breath on 

exertion which has improved. CHF exacerbation by history and BNP today. denies 

diaphoresis, fever, chills, abdominal pain, nausea, vomiting. 





- Review of Systems


General: Reports: No Symptoms


HEENT: Reports: No Symptoms


Pulmonary: Reports: No Symptoms


Cardiovascular: Reports: No Symptoms


Gastrointestinal: Reports: No Symptoms


Musculoskeletal: Reports: No Symptoms


Skin: Reports: No Symptoms


Neurological: Reports: No Symptoms


Psychiatric: Reports: No Symptoms





- Patient Data


Vitals - Most Recent: 


                                Last Vital Signs











Temp  97.6 F   01/05/21 08:00


 


Pulse  91   01/05/21 08:00


 


Resp  20   01/05/21 08:00


 


BP  149/77 H  01/05/21 08:00


 


Pulse Ox  94 L  01/05/21 08:00











Weight - Most Recent: 175 lb 6.4 oz


I&O - Last 24 hours: 


                                 Intake & Output











 01/04/21 01/05/21 01/05/21





 22:59 06:59 14:59


 


Intake Total 2000 500 


 


Output Total 600 650 


 


Balance 1400 -150 











Lab Results - Last 24 hrs: 


                         Laboratory Results - last 24 hr











  01/05/21 Range/Units





  08:00 


 


Sodium  141  (136-145)  mmol/L


 


Potassium  4.7  (3.5-5.1)  mmol/L


 


Chloride  106  ()  mmol/L


 


Carbon Dioxide  27.8  (21.0-32.0)  mmol/L


 


Anion Gap  11.9  (5.0-15.0)  mmol/L


 


BUN  18  D  (8-26)  mg/dL


 


Creatinine  0.98  D  (0.55-1.02)  mg/dL


 


Est Cr Clr Drug Dosing  31.99  mL/min


 


Estimated GFR (MDRD)  54 L  (>60)  MLS/MIN


 


BUN/Creatinine Ratio  18.4  (6-25)  


 


Glucose  82  ()  mg/dL


 


Calcium  8.6  (8.5-10.1)  mg/dL


 


Total Bilirubin  0.2  (0.0-1.0)  mg/dL


 


AST  28  (15-37)  U/L


 


ALT  24  (12-78)  U/L


 


Alkaline Phosphatase  80  ()  U/L


 


B-Natriuretic Peptide  25082 H D  (0-450)  pg/mL


 


Total Protein  6.3 L  (6.4-8.2)  g/dL


 


Albumin  2.7 L  (3.4-5.0)  g/dL


 


Globulin  3.6  (2.2-4.2)  g/dL


 


Albumin/Globulin Ratio  0.8  (0.8-2.0)  











Med Orders - Current: 


                               Current Medications





Acetaminophen (Tylenol Arthritis Pain)  1,300 mg PO TID PRN


   PRN Reason: Pain


   Last Admin: 01/04/21 19:18 Dose:  1,300 mg


   Documented by: 


Albuterol (Proventil Neb Soln)  2.5 mg NEB Q4H PRN


   PRN Reason: short of breath


Aspirin (Aspirin)  81 mg PO DAILY [facility]


   Last Admin: 01/05/21 07:43 Dose:  81 mg


   Documented by: 


Calamine/Phenol (Calmoseptine)  0 gm TOP QID PRN


   PRN Reason: Rash


   Last Admin: 01/03/21 10:12 Dose:  1 applic


   Documented by: 


Levofloxacin (Levaquin)  750 mg PO Q48H [facility]


   Last Admin: 01/05/21 08:55 Dose:  750 mg


   Documented by: 


Ondansetron HCl (Zofran)  4 mg IVPUSH Q6H PRN


   PRN Reason: Nausea/Vomiting


   Last Admin: 01/02/21 19:04 Dose:  4 mg


   Documented by: 


Pantoprazole Sodium (Protonix***)  40 mg PO ACBREAKFAST [facility]


   Last Admin: 01/05/21 07:43 Dose:  40 mg


   Documented by: 


Trolamine Salicylate (Aspercreme 10%)  0 gm TOP Q1H PRN


   PRN Reason: Other


   Last Admin: 01/03/21 14:13 Dose:  1 applic


   Documented by: 





Discontinued Medications





Acetaminophen (Tylenol Arthritis Pain)  1,300 mg PO TID [facility]


   Last Admin: 01/03/21 18:58 Dose:  Not Given


   Documented by: 


Albuterol/Ipratropium (Duoneb 3.0-0.5 Mg/3 Ml) Confirm Administered Dose 3 ml 

.ROUTE .STK-MED ONE


   Stop: 01/02/21 19:05


   Last Admin: 01/02/21 19:30 Dose:  Not Given


   Documented by: 


Albuterol/Ipratropium (Duoneb 3.0-0.5 Mg/3 Ml)  3 ml NEB Q2H ONE


   Stop: 01/02/21 19:01


   Last Admin: 01/02/21 19:00 Dose:  3 ml


   Documented by: 


Furosemide (Lasix)  20 mg IVPUSH ONETIME ONE


   Stop: 01/03/21 16:11


   Last Admin: 01/03/21 16:40 Dose:  20 mg


   Documented by: 


Sodium Chloride (Normal Saline)  1,000 mls @ 75 mls/hr IV ASDIRECTED [facility]


   Last Admin: 01/03/21 10:08 Dose:  100 mls/hr


   Documented by: 


Levofloxacin/Dextrose 750 mg/ (Levofloxacin/Dextrose)  300 mls @ 100 mls/hr IV 

Q24H [facility]


   Stop: 01/07/21 09:00


   Last Admin: 01/03/21 10:10 Dose:  100 mls/hr


   Documented by: 


Sodium Chloride (Normal Saline)  500 mls @ 100 mls/hr IV .BOLUS ONE


   Stop: 01/03/21 13:44


   Last Admin: 01/03/21 09:10 Dose:  500 mls/hr


   Documented by: 


Levofloxacin/Dextrose (Levaquin In D5w 750 Mg/150 Ml) Confirm Administered Dose 

150 mls @ as directed IV .STK-MED ONE


   Stop: 01/03/21 09:54


   Last Admin: 01/03/21 10:10 Dose:  Not Given


   Documented by: 


Sodium Chloride (Normal Saline)  1,000 mls @ 75 mls/hr IV ASDIRECTED [facility]


   Last Admin: 01/03/21 11:40 Dose:  75 mls/hr


   Documented by: 


Levofloxacin/Dextrose 750 mg/ (Levofloxacin/Dextrose)  300 mls @ 100 mls/hr IV 

Q48H [facility]


   Stop: 01/07/21 09:00


Loperamide HCl (Imodium)  4 mg PO ONETIME ONE


   Stop: 01/03/21 08:49


   Last Admin: 01/03/21 09:17 Dose:  4 mg


   Documented by: 


Ondansetron HCl (Zofran) Confirm Administered Dose 4 mg .ROUTE .STK-MED ONE


   Stop: 01/02/21 19:13


   Last Admin: 01/02/21 19:05 Dose:  Not Given


   Documented by: 











- Exam


Quality Assessment: Reports: Supplemental Oxygen


General: Reports: Alert, Oriented, Cooperative, No Acute Distress


HEENT: Reports: Pupils Equal, Pupils Reactive, EOMI, Mucous Membr. Moist/Pink


Lungs: Reports: Crackles (bilateral lower lobes R>L), Wheezing (upper lobes 

bilat L>R)


Cardiovascular: Reports: Regular Rate, Regular Rhythm, No Murmurs


GI/Abdominal Exam: Soft, Non-Tender


Extremities: Normal Inspection, Normal Range of Motion, Non-Tender, Normal 

Capillary Refill, Pedal Edema (+1)


Skin: Reports: Warm, Dry, Intact


Neurological: Reports: No New Focal Deficit


Psy/Mental Status: Reports: Alert, Normal Affect, Normal Mood

## 2021-01-05 NOTE — PCM.PN
- General Info


Date of Service: 01/05/21


Admission Dx/Problem (Free Text): 





pt admitted for weakness, dehydration, pneumonia, CHF exacerbation





Subjective Update: 





pt states she feels improved from admit, and even this morning. states weakness 

has improved and no nausea since admit. pt does state shortness of breath on 

exertion which has improved. CHF exacerbation by history and BNP today. denies 

diaphoresis, fever, chills, abdominal pain, nausea, vomiting. 


pt was discharged home and during transport became increasingly short of breath 

and confused with increasing dusky color with history provided by  and 

EMS. EMS states they arrived shortly after pt returned home and placed her on O2

and returned her to hospital. pt does not remember arriving home but does state 

EMS oxygen has helped markedly.





- Review of Systems


Pulmonary: Reports: Shortness of Breath, Wheezing


Cardiovascular: Reports: Dyspnea on Exertion, Orthopnea, Edema (bilateral lower 

extremity.)


Gastrointestinal: Reports: No Symptoms


Genitourinary: Reports: No Symptoms


Musculoskeletal: Reports: No Symptoms


Skin: Reports: Cyanosis


Neurological: Reports: Confusion





- Patient Data


Vitals - Most Recent: 


                                Last Vital Signs











Temp  97.4 F   01/05/21 16:00


 


Pulse  101 H  01/05/21 16:00


 


Resp  87 H  01/05/21 16:00


 


BP  116/58 L  01/05/21 16:00


 


Pulse Ox  5 L  01/05/21 16:00











Weight - Most Recent: 175 lb 6.4 oz


I&O - Last 24 Hours: 


                                 Intake & Output











 01/05/21 01/05/21 01/05/21





 06:59 14:59 22:59


 


Intake Total 500  


 


Output Total 650  


 


Balance -150  











Lab Results Last 24 Hours: 


                         Laboratory Results - last 24 hr











  01/05/21 Range/Units





  08:00 


 


Sodium  141  (136-145)  mmol/L


 


Potassium  4.7  (3.5-5.1)  mmol/L


 


Chloride  106  ()  mmol/L


 


Carbon Dioxide  27.8  (21.0-32.0)  mmol/L


 


Anion Gap  11.9  (5.0-15.0)  mmol/L


 


BUN  18  D  (8-26)  mg/dL


 


Creatinine  0.98  D  (0.55-1.02)  mg/dL


 


Est Cr Clr Drug Dosing  31.99  mL/min


 


Estimated GFR (MDRD)  54 L  (>60)  MLS/MIN


 


BUN/Creatinine Ratio  18.4  (6-25)  


 


Glucose  82  ()  mg/dL


 


Calcium  8.6  (8.5-10.1)  mg/dL


 


Total Bilirubin  0.2  (0.0-1.0)  mg/dL


 


AST  28  (15-37)  U/L


 


ALT  24  (12-78)  U/L


 


Alkaline Phosphatase  80  ()  U/L


 


B-Natriuretic Peptide  48084 H D  (0-450)  pg/mL


 


Total Protein  6.3 L  (6.4-8.2)  g/dL


 


Albumin  2.7 L  (3.4-5.0)  g/dL


 


Globulin  3.6  (2.2-4.2)  g/dL


 


Albumin/Globulin Ratio  0.8  (0.8-2.0)  











Med Orders - Current: 


                               Current Medications





Acetaminophen (Tylenol Arthritis Pain)  1,300 mg PO TID PRN


   PRN Reason: Pain


   Last Admin: 01/05/21 11:34 Dose:  1,300 mg


   Documented by: 


Albuterol (Proventil Neb Soln)  2.5 mg NEB Q4H PRN


   PRN Reason: short of breath


   Last Admin: 01/05/21 11:21 Dose:  2.5 mg


   Documented by: 


Aspirin (Aspirin)  81 mg PO DAILY [facility]


   Last Admin: 01/05/21 07:43 Dose:  81 mg


   Documented by: 


Calamine/Phenol (Calmoseptine)  0 gm TOP QID PRN


   PRN Reason: Rash


   Last Admin: 01/03/21 10:12 Dose:  1 applic


   Documented by: 


Levofloxacin (Levaquin)  750 mg PO Q48H [facility]


   Last Admin: 01/05/21 08:55 Dose:  750 mg


   Documented by: 


Ondansetron HCl (Zofran)  4 mg IVPUSH Q6H PRN


   PRN Reason: Nausea/Vomiting


   Last Admin: 01/02/21 19:04 Dose:  4 mg


   Documented by: 


Pantoprazole Sodium (Protonix***)  40 mg PO ACBREAKFAST [facility]


   Last Admin: 01/05/21 07:43 Dose:  40 mg


   Documented by: 


Sodium Chloride (Saline Flush)  10 ml FLUSH ASDIRECTED PRN


   PRN Reason: Keep Vein Open


Tiotropium Bromide (Spiriva Handihaler)  18 mcg INH DAILY [facility]


Trolamine Salicylate (Aspercreme 10%)  0 gm TOP Q1H PRN


   PRN Reason: Other


   Last Admin: 01/03/21 14:13 Dose:  1 applic


   Documented by: 





Discontinued Medications





Acetaminophen (Tylenol Arthritis Pain)  1,300 mg PO TID [facility]


   Last Admin: 01/03/21 18:58 Dose:  Not Given


   Documented by: 


Albuterol/Ipratropium (Duoneb 3.0-0.5 Mg/3 Ml) Confirm Administered Dose 3 ml 

.ROUTE .STK-MED ONE


   Stop: 01/02/21 19:05


   Last Admin: 01/02/21 19:30 Dose:  Not Given


   Documented by: 


Albuterol/Ipratropium (Duoneb 3.0-0.5 Mg/3 Ml)  3 ml NEB Q2H ONE


   Stop: 01/02/21 19:01


   Last Admin: 01/02/21 19:00 Dose:  3 ml


   Documented by: 


Furosemide (Lasix)  20 mg IVPUSH ONETIME ONE


   Stop: 01/03/21 16:11


   Last Admin: 01/03/21 16:40 Dose:  20 mg


   Documented by: 


Furosemide (Lasix)  20 mg PO ONETIME ONE


   Stop: 01/05/21 12:24


   Last Admin: 01/05/21 13:40 Dose:  20 mg


   Documented by: 


Furosemide (Lasix)  40 mg IVPUSH NOW ONE


   Stop: 01/05/21 16:21


Sodium Chloride (Normal Saline)  1,000 mls @ 75 mls/hr IV ASDIRECTED [facility]


   Last Admin: 01/03/21 10:08 Dose:  100 mls/hr


   Documented by: 


Levofloxacin/Dextrose 750 mg/ (Levofloxacin/Dextrose)  300 mls @ 100 mls/hr IV 

Q24H [facility]


   Stop: 01/07/21 09:00


   Last Admin: 01/03/21 10:10 Dose:  100 mls/hr


   Documented by: 


Sodium Chloride (Normal Saline)  500 mls @ 100 mls/hr IV .BOLUS ONE


   Stop: 01/03/21 13:44


   Last Admin: 01/03/21 09:10 Dose:  500 mls/hr


   Documented by: 


Levofloxacin/Dextrose (Levaquin In D5w 750 Mg/150 Ml) Confirm Administered Dose 

150 mls @ as directed IV .STK-MED ONE


   Stop: 01/03/21 09:54


   Last Admin: 01/03/21 10:10 Dose:  Not Given


   Documented by: 


Sodium Chloride (Normal Saline)  1,000 mls @ 75 mls/hr IV ASDIRECTED [facility]


   Last Admin: 01/03/21 11:40 Dose:  75 mls/hr


   Documented by: 


Levofloxacin/Dextrose 750 mg/ (Levofloxacin/Dextrose)  300 mls @ 100 mls/hr IV 

Q48H [facility]


   Stop: 01/07/21 09:00


Loperamide HCl (Imodium)  4 mg PO ONETIME ONE


   Stop: 01/03/21 08:49


   Last Admin: 01/03/21 09:17 Dose:  4 mg


   Documented by: 


Ondansetron HCl (Zofran) Confirm Administered Dose 4 mg .ROUTE .STK-MED ONE


   Stop: 01/02/21 19:13


   Last Admin: 01/02/21 19:05 Dose:  Not Given


   Documented by: 











- Exam


Quality Assessment: Supplemental Oxygen


General: Alert, Oriented, Cooperative, Mild Distress


HEENT: Pupils Equal, Pupils Reactive, EOMI, Mucous Membr. Moist/Pink


Lungs: Decreased Breath Sounds, Crackles, Rhonchi


Cardiovascular: Tachycardia


Extremities: Pedal Edema (+1-2)


Peripheral Pulses: 2+: Radial (L), Radial (R), Posterior Tibial (L), Posterior 

Tibial (R)


Skin: Warm, Dry, Intact


Neurological: No New Focal Deficit


Psy/Mental Status: Alert, Normal Affect





Sepsis Event Note





- Evaluation


Sepsis Screening Result: No Definite Risk





- Focused Exam


Vital Signs: 


                                   Vital Signs











  Temp Pulse Resp BP Pulse Ox


 


 01/05/21 16:00  97.4 F  101 H  87 H  116/58 L  5 L


 


 01/05/21 12:00  97.5 F  101 H  20  120/55 L  92 L


 


 01/05/21 08:00  97.6 F  91  20  149/77 H  94 L














- Problem List & Annotations


(1) Pneumonia


SNOMED Code(s): 875259381


   Code(s): J18.9 - PNEUMONIA, UNSPECIFIED ORGANISM   Status: Acute   Current 

Visit: Yes   


Qualifiers: 


   Pneumonia type: due to unspecified organism   Laterality: right   Lung 

location: lower lobe of lung   Qualified Code(s): J18.9 - Pneumonia, unspecified

organism   





(2) Congestive heart failure


SNOMED Code(s): 05556422


   Code(s): I50.9 - HEART FAILURE, UNSPECIFIED   Status: Acute   Current Visit: 

No   Onset Date: ~03/19/18   


Qualifiers: 


   Heart failure type: systolic   Heart failure chronicity: acute on chronic   

Qualified Code(s): I50.23 - Acute on chronic systolic (congestive) heart failure

  


Annotation/Comment:: 3/19/18 - 1. Congestive heart failure, 2. Weakness, 3. Mild

dehydration   





(3) Mild dehydration


SNOMED Code(s): 6510553573744


   Code(s): E86.0 - DEHYDRATION   Status: Acute   Current Visit: Yes   Onset 

Date: ~03/19/18   Annotation/Comment:: 3/19/18 - 1. Congestive heart failure, 2.

Weakness, 3. Mild dehydration   





(4) Weakness


SNOMED Code(s): 32277100


   Code(s): R53.1 - WEAKNESS   Status: Acute   Current Visit: Yes   Onset Date: 

~03/19/18   Annotation/Comment:: 3/19/18 - 1. Congestive heart failure, 2. 

Weakness, 3. Mild dehydration   





(5) Acute respiratory failure with hypoxia


SNOMED Code(s): 16877698, 098380629


   Code(s): J96.01 - ACUTE RESPIRATORY FAILURE WITH HYPOXIA   Status: Acute   

Current Visit: Yes   





- Problem List Review


Problem List Initiated/Reviewed/Updated: Yes





- My Orders


Last 24 Hours: 


My Active Orders





01/04/21 Dinner


Adult Diet [DIET] 





01/05/21 09:00


levoFLOXacin [Levaquin]   750 mg PO Q48H 





01/05/21 16:20


Sodium Chloride 0.9% [Saline Flush]   10 ml FLUSH ASDIRECTED PRN 


Peripheral IV Insertion Adult [OM.PC] Routine 





01/05/21 16:24


RT Post Treatment Assessment [RC] Click to Edit 





01/05/21 16:30


Tiotropium [Spiriva HandiHaler]   18 mcg INH DAILY 





Code: DNR/DNI





lasix BID 





PT/OT eval tomorrow.








- Plan


Plan:: 





repeat labs in AM: CMP and BNP


continue levaquin PO


monitor closely with bipap on standby in case of return of respiratory failure. 

pt at baseline shortly after returning to room with EMS and hospital O2.


albuterol nebs PRN


incentive spirometry


will continue diuresis 1-2 days and daily weights. 


eval for home care with PT and OT and home respiratory equipment including home 

and transport O2 should be assessed as I believe this to be the main protagonist

for this episode of respiratory failure

## 2021-01-06 NOTE — PCM.PN
- General Info


Date of Service: 01/06/21


Admission Dx/Problem (Free Text): 





pt admitted for weakness, dehydration, pneumonia, CHF exacerbation





Subjective Update: 





patient reports that her breathing is getting better, but still get SOB on 

exertion.


Still requiring 4-5 Lit by NC to maintain O2 Sat >94%


Reports some dizziness for a week or so that it worse with turning her face - 

h/o vertigo.


No CP, fever or chills. 





- Review of Systems


General: Reports: Fatigue


HEENT: Reports: No Symptoms


Pulmonary: Reports: Shortness of Breath


Cardiovascular: Reports: Dyspnea on Exertion, Orthopnea, Edema, Lightheadedness


Gastrointestinal: Reports: No Symptoms


Genitourinary: Reports: No Symptoms


Neurological: Reports: No Symptoms


Psychiatric: Reports: No Symptoms





- Patient Data


Vitals - Most Recent: 


                                Last Vital Signs











Temp  36.9 C   01/06/21 08:00


 


Pulse  111 H  01/06/21 08:00


 


Resp  22 H  01/06/21 08:00


 


BP  136/66   01/06/21 08:00


 


Pulse Ox  94 L  01/06/21 08:00











Weight - Most Recent: 78.188 kg


I&O - Last 24 Hours: 


                                 Intake & Output











 01/05/21 01/06/21 01/06/21





 22:59 06:59 14:59


 


Intake Total 920 400 


 


Output Total 550 2075 


 


Balance 370 -1675 











Lab Results Last 24 Hours: 


                         Laboratory Results - last 24 hr











  01/06/21 Range/Units





  08:00 


 


Sodium  138  (136-145)  mmol/L


 


Potassium  4.5  (3.5-5.1)  mmol/L


 


Chloride  100  ()  mmol/L


 


Carbon Dioxide  28.5  (21.0-32.0)  mmol/L


 


Anion Gap  14.0  (5.0-15.0)  mmol/L


 


BUN  19  (8-26)  mg/dL


 


Creatinine  1.26 H D  (0.55-1.02)  mg/dL


 


Est Cr Clr Drug Dosing  24.88  mL/min


 


Estimated GFR (MDRD)  40 L  (>60)  MLS/MIN


 


BUN/Creatinine Ratio  15.1  (6-25)  


 


Glucose  86  ()  mg/dL


 


Calcium  8.5  (8.5-10.1)  mg/dL


 


Total Bilirubin  0.2  (0.0-1.0)  mg/dL


 


AST  33  (15-37)  U/L


 


ALT  25  (12-78)  U/L


 


Alkaline Phosphatase  102  ()  U/L


 


B-Natriuretic Peptide  34224 H D  (0-450)  pg/mL


 


Total Protein  6.7  (6.4-8.2)  g/dL


 


Albumin  2.9 L  (3.4-5.0)  g/dL


 


Globulin  3.8  (2.2-4.2)  g/dL


 


Albumin/Globulin Ratio  0.8  (0.8-2.0)  











Med Orders - Current: 


                               Current Medications





Acetaminophen (Tylenol Arthritis Pain)  1,300 mg PO TID PRN


   PRN Reason: Pain


   Last Admin: 01/05/21 19:21 Dose:  1,300 mg


   Documented by: 


Albuterol (Proventil Neb Soln)  2.5 mg NEB Q4H PRN


   PRN Reason: short of breath


   Last Admin: 01/06/21 08:48 Dose:  2.5 mg


   Documented by: 


Aspirin (Aspirin)  81 mg PO DAILY [facility]


   Last Admin: 01/06/21 07:34 Dose:  81 mg


   Documented by: 


Calamine/Phenol (Calmoseptine)  0 gm TOP QID PRN


   PRN Reason: Rash


   Last Admin: 01/03/21 10:12 Dose:  1 applic


   Documented by: 


Furosemide (Lasix)  20 mg IVPUSH BIDDIURETIC [facility]


   Last Admin: 01/06/21 07:43 Dose:  20 mg


   Documented by: 


Levofloxacin (Levaquin)  750 mg PO Q48H [facility]


   Last Admin: 01/05/21 08:55 Dose:  750 mg


   Documented by: 


Ondansetron HCl (Zofran)  4 mg IVPUSH Q6H PRN


   PRN Reason: Nausea/Vomiting


   Last Admin: 01/02/21 19:04 Dose:  4 mg


   Documented by: 


Pantoprazole Sodium (Protonix***)  40 mg PO ACBREAKFAST [facility]


   Last Admin: 01/06/21 07:34 Dose:  40 mg


   Documented by: 


Sodium Chloride (Saline Flush)  10 ml FLUSH ASDIRECTED PRN


   PRN Reason: Keep Vein Open


Tiotropium Bromide (Spiriva Handihaler)  18 mcg INH DAILY [facility]


   Last Admin: 01/06/21 07:34 Dose:  1 puff


   Documented by: 


Trolamine Salicylate (Aspercreme 10%)  0 gm TOP Q1H PRN


   PRN Reason: Other


   Last Admin: 01/03/21 14:13 Dose:  1 applic


   Documented by: 





Discontinued Medications





Acetaminophen (Tylenol Arthritis Pain)  1,300 mg PO TID [facility]


   Last Admin: 01/03/21 18:58 Dose:  Not Given


   Documented by: 


Albuterol/Ipratropium (Duoneb 3.0-0.5 Mg/3 Ml) Confirm Administered Dose 3 ml 

.ROUTE .STK-MED ONE


   Stop: 01/02/21 19:05


   Last Admin: 01/02/21 19:30 Dose:  Not Given


   Documented by: 


Albuterol/Ipratropium (Duoneb 3.0-0.5 Mg/3 Ml)  3 ml NEB Q2H ONE


   Stop: 01/02/21 19:01


   Last Admin: 01/02/21 19:00 Dose:  3 ml


   Documented by: 


Furosemide (Lasix)  20 mg IVPUSH ONETIME ONE


   Stop: 01/03/21 16:11


   Last Admin: 01/03/21 16:40 Dose:  20 mg


   Documented by: 


Furosemide (Lasix)  20 mg PO ONETIME ONE


   Stop: 01/05/21 12:24


   Last Admin: 01/05/21 13:40 Dose:  20 mg


   Documented by: 


Furosemide (Lasix)  40 mg IVPUSH NOW ONE


   Stop: 01/05/21 16:21


   Last Admin: 01/05/21 16:31 Dose:  40 mg


   Documented by: 


Sodium Chloride (Normal Saline)  1,000 mls @ 75 mls/hr IV ASDIRECTED [facility]


   Last Admin: 01/03/21 10:08 Dose:  100 mls/hr


   Documented by: 


Levofloxacin/Dextrose 750 mg/ (Levofloxacin/Dextrose)  300 mls @ 100 mls/hr IV 

Q24H [facility]


   Stop: 01/07/21 09:00


   Last Admin: 01/03/21 10:10 Dose:  100 mls/hr


   Documented by: 


Sodium Chloride (Normal Saline)  500 mls @ 100 mls/hr IV .BOLUS ONE


   Stop: 01/03/21 13:44


   Last Admin: 01/03/21 09:10 Dose:  500 mls/hr


   Documented by: 


Levofloxacin/Dextrose (Levaquin In D5w 750 Mg/150 Ml) Confirm Administered Dose 

150 mls @ as directed IV .STK-MED ONE


   Stop: 01/03/21 09:54


   Last Admin: 01/03/21 10:10 Dose:  Not Given


   Documented by: 


Sodium Chloride (Normal Saline)  1,000 mls @ 75 mls/hr IV ASDIRECTED [facility]


   Last Admin: 01/03/21 11:40 Dose:  75 mls/hr


   Documented by: 


Levofloxacin/Dextrose 750 mg/ (Levofloxacin/Dextrose)  300 mls @ 100 mls/hr IV 

Q48H [facility]


   Stop: 01/07/21 09:00


Loperamide HCl (Imodium)  4 mg PO ONETIME ONE


   Stop: 01/03/21 08:49


   Last Admin: 01/03/21 09:17 Dose:  4 mg


   Documented by: 


Ondansetron HCl (Zofran) Confirm Administered Dose 4 mg .ROUTE .STK-MED ONE


   Stop: 01/02/21 19:13


   Last Admin: 01/02/21 19:05 Dose:  Not Given


   Documented by: 











- Exam


Quality Assessment: Supplemental Oxygen


General: Alert, Oriented, Cooperative, No Acute Distress


HEENT: Pupils Equal


Lungs: Crackles (fine crackles b/l basal)


Cardiovascular: Regular Rate, Tachycardia


GI/Abdominal Exam: Normal Bowel Sounds, Soft, Non-Tender


Extremities: Pedal Edema


Neurological: No New Focal Deficit


Psy/Mental Status: Alert, Normal Affect


  ** #1 Interpretation


EKG Date: 01/06/21


Rhythm: NSR


Rate (Beats/Min): 125


Axis: Normal


P-Wave: Present


QRS: RBBB


ST-T: Normal


QT: Normal





Sepsis Event Note





- Evaluation


Sepsis Screening Result: Sepsis Risk





- Focused Exam


Vital Signs: 


                                   Vital Signs











  Temp Pulse Resp BP Pulse Ox


 


 01/06/21 08:00  36.9 C  111 H  22 H  136/66  94 L


 


 01/06/21 04:00  36.6 C  125 H  22 H  91/45 L  93 L


 


 01/06/21 00:00  36.1 C  120 H  18  94/47 L  95














- Problem List & Annotations


(1) Tachycardia


SNOMED Code(s): 1004505


   Code(s): R00.0 - TACHYCARDIA, UNSPECIFIED   Status: Acute   Priority: Medium 

 Current Visit: Yes   





(2) Acute respiratory failure with hypoxia


SNOMED Code(s): 95804901, 759191563


   Code(s): J96.01 - ACUTE RESPIRATORY FAILURE WITH HYPOXIA   Status: Acute   

Priority: High   Current Visit: Yes   





(3) Pneumonia


SNOMED Code(s): 910262383


   Code(s): J18.9 - PNEUMONIA, UNSPECIFIED ORGANISM   Status: Acute   Priority: 

High   Current Visit: Yes   


Qualifiers: 


   Pneumonia type: due to unspecified organism   Laterality: right   Lung 

location: lower lobe of lung   Qualified Code(s): J18.9 - Pneumonia, unspecified

organism   





(4) Congestive heart failure


SNOMED Code(s): 30030874


   Code(s): I50.9 - HEART FAILURE, UNSPECIFIED   Status: Acute   Priority: 

Medium   Current Visit: No   Onset Date: ~03/19/18   


Qualifiers: 


   Heart failure type: systolic   Heart failure chronicity: acute on chronic   

Qualified Code(s): I50.23 - Acute on chronic systolic (congestive) heart failure

  


Annotation/Comment:: 3/19/18 - 1. Congestive heart failure, 2. Weakness, 3. Mild

dehydration   





- Problem List Review


Problem List Initiated/Reviewed/Updated: Yes





- My Orders


Last 24 Hours: 


My Active Orders





01/06/21 09:41


EKG Documentation Completion [RC] ASDIRECTED 


CXR [Chest 1V Frontal] [CR] Routine 


EKG 12 Lead [EK] Routine 














- Plan


Plan:: 








1- PNA: continue Levaquin 750 mg PO q 36-48 hrs due to CKD.


2- Acute Respiratory failure with Acute hypoxia: secondary to pneumonia and CHF.

 O2 Supply to keep SpO2 >94%. Continue diuresis. albuterol nebs PRN


incentive spirometry. 


3- Acute on Chronic CHF: continue diuresis lasix 40mg PO BID and daily weights. 


4- Sinus tachycardia: EKG showed NSR tachycardia. probably 2/2 PNA and CHF. Home

 dose of atenolol was started on 1/06


5- Vertigo and dizziness:  Meclizine 12.5mg PO as needed





Dispo:


, PT and OT eval for need for placement, DMEs and medical 

assistance once stable

## 2021-01-07 NOTE — PCM.PN
- General Info


Date of Service: 01/07/21


Admission Dx/Problem (Free Text): 





pt admitted for weakness, dehydration, pneumonia, CHF exacerbation





Subjective Update: 





reports her breathing is better than yesterday but still get SOB with exertions.


No CP, no fever or chills. 


Reports some dizziness mayda ambulation - she recalls it feels similar to previous

episodes of vertigo. 


She had an uneventful morning but this after noon after returning from the 

bathroom. She told the nurse she is feeling dizzy and it's '' time for 

goodbye''.


Rapid response was activated - BP was noted to be low 70/50, and HR was in 100-

120.


She was breathing, but not responsive. 


IVF was given, EKG and labs were ordered. 


Patient regained consciousness and denies any CP, SOB or weakness. 


Functional Status: Reports: Tolerating Diet





- Review of Systems


General: Reports: Fatigue


HEENT: Reports: No Symptoms


Pulmonary: Reports: Shortness of Breath


Cardiovascular: Reports: Dyspnea on Exertion, Orthopnea, Lightheadedness.  

Denies: Chest Pain


Gastrointestinal: Reports: No Symptoms


Musculoskeletal: Reports: No Symptoms


Skin: Reports: No Symptoms


Neurological: Reports: Dizziness.  Denies: Headache, Numbness


Psychiatric: Reports: No Symptoms





- Patient Data


Vitals - Most Recent: 


                                Last Vital Signs











Temp  36.6 C   01/07/21 11:00


 


Pulse  80   01/07/21 11:00


 


Resp  16   01/07/21 11:00


 


BP  133/50 L  01/07/21 11:00


 


Pulse Ox  94 L  01/07/21 11:00











Weight - Most Recent: 78.188 kg


I&O - Last 24 Hours: 


                                 Intake & Output











 01/06/21 01/07/21 01/07/21





 22:59 06:59 14:59


 


Intake Total 940 400 


 


Output Total 1125 1025 


 


Balance -185 -625 











Lab Results Last 24 Hours: 


                         Laboratory Results - last 24 hr











  01/07/21 01/07/21 01/07/21 Range/Units





  13:30 13:30 13:34 


 


WBC    11.2 H D  (4.0-11.0)  K/uL


 


RBC    4.03  (3.80-5.80)  M/uL


 


Hgb    11.3 L  (11.5-16.5)  g/dL


 


Hct    38.3  (37.0-47.0)  %


 


MCV    95  (76-96)  fL


 


MCH    28.0  (27.0-32.0)  pg


 


MCHC    29.5 L  (31.0-35.0)  g/dL


 


RDW    15.0  (11.0-16.0)  %


 


Plt Count    303  (150-500)  K/uL


 


MPV    8.8  (6.0-10.0)  fL


 


Sodium   137   (136-145)  mmol/L


 


Potassium   5.2 H   (3.5-5.1)  mmol/L


 


Chloride   99   ()  mmol/L


 


Carbon Dioxide   21.8  D   (21.0-32.0)  mmol/L


 


Anion Gap   21.4 H   (5.0-15.0)  mmol/L


 


BUN   24  D   (8-26)  mg/dL


 


Creatinine   2.04 H D   (0.55-1.02)  mg/dL


 


Est Cr Clr Drug Dosing   15.37   mL/min


 


Estimated GFR (MDRD)   23 L   (>60)  MLS/MIN


 


BUN/Creatinine Ratio   11.8   (6-25)  


 


Glucose   233 H D   ()  mg/dL


 


Calcium   8.5   (8.5-10.1)  mg/dL


 


Total Bilirubin   0.2   (0.0-1.0)  mg/dL


 


AST   22   (15-37)  U/L


 


ALT   23   (12-78)  U/L


 


Alkaline Phosphatase   86   ()  U/L


 


Troponin I  0.428 H* D    (0.000-0.060)  ng/mL


 


Total Protein   6.5   (6.4-8.2)  g/dL


 


Albumin   2.8 L   (3.4-5.0)  g/dL


 


Globulin   3.7   (2.2-4.2)  g/dL


 


Albumin/Globulin Ratio   0.8   (0.8-2.0)  











Med Orders - Current: 


                               Current Medications





Acetaminophen (Tylenol Arthritis Pain)  1,300 mg PO TID PRN


   PRN Reason: Pain


   Last Admin: 01/07/21 08:57 Dose:  1,300 mg


   Documented by: 


Albuterol (Proventil Neb Soln)  2.5 mg NEB Q4H PRN


   PRN Reason: short of breath


   Last Admin: 01/06/21 08:48 Dose:  2.5 mg


   Documented by: 


Aspirin (Aspirin)  81 mg PO DAILY [facility]


   Last Admin: 01/07/21 08:06 Dose:  81 mg


   Documented by: 


Atenolol (Tenormin)  25 mg PO DAILY [facility]


   Last Admin: 01/07/21 08:07 Dose:  25 mg


   Documented by: 


Atorvastatin Calcium (Lipitor)  10 mg PO BEDTIME [facility]


   Last Admin: 01/06/21 19:26 Dose:  10 mg


   Documented by: 


Calamine/Phenol (Calmoseptine)  0 gm TOP QID PRN


   PRN Reason: Rash


   Last Admin: 01/03/21 10:12 Dose:  1 applic


   Documented by: 


Furosemide (Lasix)  40 mg PO BID@0800,1400 [facility]


   Last Admin: 01/07/21 13:41 Dose:  Not Given


   Documented by: 


Levofloxacin (Levaquin)  750 mg PO Q48H [facility]


   Last Admin: 01/07/21 08:07 Dose:  750 mg


   Documented by: 


Meclizine HCl (Antivert)  12.5 mg PO DAILY PRN


   PRN Reason: Dizziness


   Last Admin: 01/07/21 11:31 Dose:  12.5 mg


   Documented by: 


Ondansetron HCl (Zofran)  4 mg IVPUSH Q6H PRN


   PRN Reason: Nausea/Vomiting


   Last Admin: 01/07/21 09:54 Dose:  4 mg


   Documented by: 


Pantoprazole Sodium (Protonix***)  40 mg PO ACBREAKFAST [facility]


   Last Admin: 01/07/21 08:07 Dose:  40 mg


   Documented by: 


Sodium Chloride (Saline Flush)  10 ml FLUSH ASDIRECTED PRN


   PRN Reason: Keep Vein Open


   Last Admin: 01/06/21 19:42 Dose:  10 ml


   Documented by: 


Tiotropium Bromide (Spiriva Handihaler)  18 mcg INH DAILY [facility]


   Last Admin: 01/07/21 08:08 Dose:  1 puff


   Documented by: 


Trolamine Salicylate (Aspercreme 10%)  0 gm TOP Q1H PRN


   PRN Reason: Other


   Last Admin: 01/03/21 14:13 Dose:  1 applic


   Documented by: 





Discontinued Medications





Acetaminophen (Tylenol Arthritis Pain)  1,300 mg PO TID [facility]


   Last Admin: 01/03/21 18:58 Dose:  Not Given


   Documented by: 


Albuterol/Ipratropium (Duoneb 3.0-0.5 Mg/3 Ml) Confirm Administered Dose 3 ml 

.ROUTE .STK-MED ONE


   Stop: 01/02/21 19:05


   Last Admin: 01/02/21 19:30 Dose:  Not Given


   Documented by: 


Albuterol/Ipratropium (Duoneb 3.0-0.5 Mg/3 Ml)  3 ml NEB Q2H ONE


   Stop: 01/02/21 19:01


   Last Admin: 01/02/21 19:00 Dose:  3 ml


   Documented by: 


Furosemide (Lasix)  20 mg IVPUSH ONETIME ONE


   Stop: 01/03/21 16:11


   Last Admin: 01/03/21 16:40 Dose:  20 mg


   Documented by: 


Furosemide (Lasix)  20 mg PO ONETIME ONE


   Stop: 01/05/21 12:24


   Last Admin: 01/05/21 13:40 Dose:  20 mg


   Documented by: 


Furosemide (Lasix)  40 mg IVPUSH NOW ONE


   Stop: 01/05/21 16:21


   Last Admin: 01/05/21 16:31 Dose:  40 mg


   Documented by: 


Furosemide (Lasix)  20 mg IVPUSH BIDDIURETIC [facility]


   Last Admin: 01/06/21 07:43 Dose:  20 mg


   Documented by: 


Furosemide (Lasix)  40 mg PO BID [facility]


Sodium Chloride (Normal Saline)  1,000 mls @ 75 mls/hr IV ASDIRECTED [facility]


   Last Admin: 01/03/21 10:08 Dose:  100 mls/hr


   Documented by: 


Levofloxacin/Dextrose 750 mg/ (Levofloxacin/Dextrose)  300 mls @ 100 mls/hr IV 

Q24H [facility]


   Stop: 01/07/21 09:00


   Last Admin: 01/03/21 10:10 Dose:  100 mls/hr


   Documented by: 


Sodium Chloride (Normal Saline)  500 mls @ 100 mls/hr IV .BOLUS ONE


   Stop: 01/03/21 13:44


   Last Admin: 01/03/21 09:10 Dose:  500 mls/hr


   Documented by: 


Levofloxacin/Dextrose (Levaquin In D5w 750 Mg/150 Ml) Confirm Administered Dose 

150 mls @ as directed IV .STK-MED ONE


   Stop: 01/03/21 09:54


   Last Admin: 01/03/21 10:10 Dose:  Not Given


   Documented by: 


Sodium Chloride (Normal Saline)  1,000 mls @ 75 mls/hr IV ASDIRECTED [facility]


   Last Admin: 01/03/21 11:40 Dose:  75 mls/hr


   Documented by: 


Levofloxacin/Dextrose 750 mg/ (Levofloxacin/Dextrose)  300 mls @ 100 mls/hr IV 

Q48H [facility]


   Stop: 01/07/21 09:00


Loperamide HCl (Imodium)  4 mg PO ONETIME ONE


   Stop: 01/03/21 08:49


   Last Admin: 01/03/21 09:17 Dose:  4 mg


   Documented by: 


Magnesium Oxide (Magnesium Oxide)  400 mg PO ONETIME ONE


   Stop: 01/06/21 11:20


   Last Admin: 01/06/21 14:18 Dose:  400 mg


   Documented by: 


Magnesium Oxide (Magnesium Oxide) Confirm Administered Dose 400 mg .ROUTE 

.STK-MED ONE


   Stop: 01/06/21 14:19


   Last Admin: 01/06/21 14:23 Dose:  Not Given


   Documented by: 


Meclizine HCl (Antivert)  12.5 mg PO ONETIME ONE


   Stop: 01/06/21 09:46


   Last Admin: 01/06/21 10:41 Dose:  12.5 mg


   Documented by: 


Ondansetron HCl (Zofran) Confirm Administered Dose 4 mg .ROUTE .STK-MED ONE


   Stop: 01/02/21 19:13


   Last Admin: 01/02/21 19:05 Dose:  Not Given


   Documented by: 











- Exam


Quality Assessment: Supplemental Oxygen


General: Alert, Oriented, Cooperative, No Acute Distress


HEENT: Pupils Equal, Pupils Reactive


Lungs: Normal Respiratory Effort, Other (b/l basal fine crackles)


Cardiovascular: Regular Rhythm, Tachycardia


GI/Abdominal Exam: Normal Bowel Sounds, Soft, Non-Tender


Extremities: Pedal Edema (+ b/l pitting)


Skin: Warm


Psy/Mental Status: Alert, Normal Affect


  ** #1 Interpretation


EKG Date: 01/07/21


Rhythm: NSR


Rate (Beats/Min): 105


QRS: Normal


ST-T: Normal


QT: Normal





Sepsis Event Note





- Evaluation


Sepsis Screening Result: No Definite Risk





- Focused Exam


Vital Signs: 


                                   Vital Signs











  Temp Pulse Pulse Resp BP BP Pulse Ox


 


 01/07/21 11:00  36.6 C   80  16   133/50 L  94 L


 


 01/07/21 08:07   79    157/75 H  


 


 01/07/21 07:00  35.9 C L   79  18   157/75 H  94 L


 


 01/07/21 03:00  36.4 C   90  16    95














- Problem List & Annotations


(1) Tachycardia


SNOMED Code(s): 5732354


   Code(s): R00.0 - TACHYCARDIA, UNSPECIFIED   Status: Acute   Priority: Medium 

 Current Visit: Yes   





(2) Acute respiratory failure with hypoxia


SNOMED Code(s): 84597273, 808638875


   Code(s): J96.01 - ACUTE RESPIRATORY FAILURE WITH HYPOXIA   Status: Acute   

Priority: High   Current Visit: Yes   





(3) Pneumonia


SNOMED Code(s): 506449060


   Code(s): J18.9 - PNEUMONIA, UNSPECIFIED ORGANISM   Status: Acute   Priority: 

High   Current Visit: Yes   


Qualifiers: 


   Pneumonia type: due to unspecified organism   Laterality: right   Lung 

location: lower lobe of lung   Qualified Code(s): J18.9 - Pneumonia, unspecified

organism   





(4) Congestive heart failure


SNOMED Code(s): 75502989


   Code(s): I50.9 - HEART FAILURE, UNSPECIFIED   Status: Acute   Priority: 

Medium   Current Visit: No   Onset Date: ~03/19/18   


Qualifiers: 


   Heart failure type: systolic   Heart failure chronicity: acute on chronic   

Qualified Code(s): I50.23 - Acute on chronic systolic (congestive) heart failure

  


Annotation/Comment:: 3/19/18 - 1. Congestive heart failure, 2. Weakness, 3. Mild

dehydration   





(5) Syncope and collapse


SNOMED Code(s): 215727789


   Code(s): R55 - SYNCOPE AND COLLAPSE   Status: Acute   Priority: Medium   

Current Visit: Yes   





(6) NUNO (acute kidney injury)


SNOMED Code(s): 10234086, 21100313


   Code(s): N17.9 - ACUTE KIDNEY FAILURE, UNSPECIFIED   Status: Acute   

Priority: Medium   Current Visit: Yes   





- Problem List Review


Problem List Initiated/Reviewed/Updated: Yes





- My Orders


Last 24 Hours: 


My Active Orders





01/06/21 16:00


Furosemide [Lasix]   40 mg PO BID@0800,1400 





01/06/21 20:00


atorvaSTATin [Lipitor]   10 mg PO BEDTIME 





01/07/21 11:18


Meclizine [Antivert]   12.5 mg PO DAILY [facility]N 





01/07/21 13:14


EKG Documentation Completion [RC] ASDIRECTED 














- Plan


Plan:: 








1- PNA: continue Levaquin 750 mg PO q 36-48 hrs due to CKD.


2- Acute Respiratory failure with Acute hypoxia: secondary to pneumonia and CHF.

 O2 Supply to keep SpO2 >94%.  diuretics were started 1/4. albuterol nebs PRN. 

incentive spirometry. 


3- Acute on Chronic CHF: will decrease diuresis lasix to 10mg PO BID and daily 

weights. 


4- Sinus tachycardia: EKG showed NSR tachycardia. probably 2/2 PNA and CHF. Home

 dose of atenolol was started on 1/06


5- Vertigo and dizziness:  Meclizine 12.5mg PO as needed


6- Syncope: probably 2/2 cardiac event or dehydration from diuresis. Will hold 

on diuretics today and continue to monitor. 





Dispo:


, PT and OT eval for need for placement, DMEs and medical 

assistance once stable .





On 1/7/21 - patient had a syncopal episode while ambulating from the bathroom to

 her bed. BP was found to be low. Responded well to fluids. No neurological 

deficits.

## 2021-01-07 NOTE — CR
Date of Service:  01/06/21

Clinical Data:  SOB



AP CHEST:  



Comparison is made to a prior exam dated 01/03/21  



The heart size is stable.  



There is persistent eventration of the right hemidiaphragm.  There is persistent
increased density in the right lung base consistent with basilar atelectasis or 
infiltrate, however, it does appear clearer than on the prior study.  



The left lung is stable.  No pneumothorax.  No pleural effusion.  



There is a moderate-sized hiatal hernia.  



[id number]

[facility]

## 2021-01-08 NOTE — PCM.PN
- General Info


Date of Service: 01/08/21


Admission Dx/Problem (Free Text): 





pt admitted for weakness, dehydration, pneumonia, CHF exacerbation





Subjective Update: 





reports her breathing is improving and is experiencing less dizziness than 

yesterday


No CP, no fever or chills. 


Vitals have been stable and good





- Review of Systems


General: Reports: No Symptoms


HEENT: Reports: No Symptoms


Pulmonary: Reports: Shortness of Breath


Cardiovascular: Reports: Dyspnea on Exertion (improving)


Gastrointestinal: Reports: No Symptoms


Musculoskeletal: Reports: No Symptoms


Skin: Reports: No Symptoms


Neurological: Reports: No Symptoms, Dizziness


Psychiatric: Reports: No Symptoms





- Patient Data


Vitals - Most Recent: 


                                Last Vital Signs











Temp  36.2 C   01/08/21 07:00


 


Pulse  71   01/08/21 09:11


 


Resp  17   01/08/21 07:00


 


BP  140/70   01/08/21 09:11


 


Pulse Ox  100   01/08/21 07:00











Weight - Most Recent: 77.746 kg


I&O - Last 24 Hours: 


                                 Intake & Output











 01/07/21 01/08/21 01/08/21





 22:59 06:59 14:59


 


Intake Total 1160 300 


 


Output Total 400 650 


 


Balance 760 -350 











Lab Results Last 24 Hours: 


                         Laboratory Results - last 24 hr











  01/07/21 01/07/21 01/07/21 Range/Units





  13:30 13:30 13:34 


 


WBC    11.2 H D  (4.0-11.0)  K/uL


 


RBC    4.03  (3.80-5.80)  M/uL


 


Hgb    11.3 L  (11.5-16.5)  g/dL


 


Hct    38.3  (37.0-47.0)  %


 


MCV    95  (76-96)  fL


 


MCH    28.0  (27.0-32.0)  pg


 


MCHC    29.5 L  (31.0-35.0)  g/dL


 


RDW    15.0  (11.0-16.0)  %


 


Plt Count    303  (150-500)  K/uL


 


MPV    8.8  (6.0-10.0)  fL


 


Sodium   137   (136-145)  mmol/L


 


Potassium   5.2 H   (3.5-5.1)  mmol/L


 


Chloride   99   ()  mmol/L


 


Carbon Dioxide   21.8  D   (21.0-32.0)  mmol/L


 


Anion Gap   21.4 H   (5.0-15.0)  mmol/L


 


BUN   24  D   (8-26)  mg/dL


 


Creatinine   2.04 H D   (0.55-1.02)  mg/dL


 


Est Cr Clr Drug Dosing   15.37   mL/min


 


Estimated GFR (MDRD)   23 L   (>60)  MLS/MIN


 


BUN/Creatinine Ratio   11.8   (6-25)  


 


Glucose   233 H D   ()  mg/dL


 


Calcium   8.5   (8.5-10.1)  mg/dL


 


Total Bilirubin   0.2   (0.0-1.0)  mg/dL


 


AST   22   (15-37)  U/L


 


ALT   23   (12-78)  U/L


 


Alkaline Phosphatase   86   ()  U/L


 


Troponin I  0.428 H* D    (0.000-0.060)  ng/mL


 


Total Protein   6.5   (6.4-8.2)  g/dL


 


Albumin   2.8 L   (3.4-5.0)  g/dL


 


Globulin   3.7   (2.2-4.2)  g/dL


 


Albumin/Globulin Ratio   0.8   (0.8-2.0)  











Med Orders - Current: 


                               Current Medications





Acetaminophen (Tylenol Arthritis Pain)  1,300 mg PO TID PRN


   PRN Reason: Pain


   Last Admin: 01/07/21 20:52 Dose:  1,300 mg


   Documented by: 


Albuterol (Proventil Neb Soln)  2.5 mg NEB Q4H PRN


   PRN Reason: short of breath


   Last Admin: 01/06/21 08:48 Dose:  2.5 mg


   Documented by: 


Aspirin (Aspirin)  81 mg PO DAILY [facility]


   Last Admin: 01/08/21 09:11 Dose:  81 mg


   Documented by: 


Atenolol (Tenormin)  25 mg PO DAILY [facility]


   Last Admin: 01/08/21 09:11 Dose:  25 mg


   Documented by: 


Atorvastatin Calcium (Lipitor)  10 mg PO BEDTIME [facility]


   Last Admin: 01/07/21 20:52 Dose:  10 mg


   Documented by: 


Calamine/Phenol (Calmoseptine)  0 gm TOP QID PRN


   PRN Reason: Rash


   Last Admin: 01/03/21 10:12 Dose:  1 applic


   Documented by: 


Levofloxacin (Levaquin)  750 mg PO Q48H [facility]


   Last Admin: 01/07/21 08:07 Dose:  750 mg


   Documented by: 


Meclizine HCl (Antivert)  12.5 mg PO DAILY PRN


   PRN Reason: Dizziness


   Last Admin: 01/08/21 09:20 Dose:  12.5 mg


   Documented by: 


Ondansetron HCl (Zofran)  4 mg IVPUSH Q6H PRN


   PRN Reason: Nausea/Vomiting


   Last Admin: 01/07/21 09:54 Dose:  4 mg


   Documented by: 


Pantoprazole Sodium (Protonix***)  40 mg PO ACBREAKFAST [facility]


   Last Admin: 01/08/21 09:11 Dose:  40 mg


   Documented by: 


Sodium Chloride (Saline Flush)  10 ml FLUSH ASDIRECTED PRN


   PRN Reason: Keep Vein Open


   Last Admin: 01/06/21 19:42 Dose:  10 ml


   Documented by: 


Tiotropium Bromide (Spiriva Handihaler)  18 mcg INH DAILY [facility]


   Last Admin: 01/08/21 09:12 Dose:  1 puff


   Documented by: 


Trolamine Salicylate (Aspercreme 10%)  0 gm TOP Q1H PRN


   PRN Reason: Other


   Last Admin: 01/03/21 14:13 Dose:  1 applic


   Documented by: 





Discontinued Medications





Acetaminophen (Tylenol Arthritis Pain)  1,300 mg PO TID [facility]


   Last Admin: 01/03/21 18:58 Dose:  Not Given


   Documented by: 


Albuterol/Ipratropium (Duoneb 3.0-0.5 Mg/3 Ml) Confirm Administered Dose 3 ml 

.ROUTE .STK-MED ONE


   Stop: 01/02/21 19:05


   Last Admin: 01/02/21 19:30 Dose:  Not Given


   Documented by: 


Albuterol/Ipratropium (Duoneb 3.0-0.5 Mg/3 Ml)  3 ml NEB Q2H [facility]


   Stop: 01/02/21 19:01


   Last Admin: 01/02/21 19:00 Dose:  3 ml


   Documented by: 


Furosemide (Lasix)  20 mg IVPUSH ONETIME [facility]


   Stop: 01/03/21 16:11


   Last Admin: 01/03/21 16:40 Dose:  20 mg


   Documented by: 


Furosemide (Lasix)  20 mg PO ONETIME ONE


   Stop: 01/05/21 12:24


   Last Admin: 01/05/21 13:40 Dose:  20 mg


   Documented by: 


Furosemide (Lasix)  40 mg IVPUSH NOW ONE


   Stop: 01/05/21 16:21


   Last Admin: 01/05/21 16:31 Dose:  40 mg


   Documented by: 


Furosemide (Lasix)  20 mg IVPUSH BIDDIURETIC [facility]


   Last Admin: 01/06/21 07:43 Dose:  20 mg


   Documented by: 


Furosemide (Lasix)  40 mg PO BID [facility]


Furosemide (Lasix)  40 mg PO BID@0800,1400 [facility]


   Last Admin: 01/07/21 13:41 Dose:  Not Given


   Documented by: 


Sodium Chloride (Normal Saline)  1,000 mls @ 75 mls/hr IV ASDIRECTED [facility]


   Last Admin: 01/03/21 10:08 Dose:  100 mls/hr


   Documented by: 


Levofloxacin/Dextrose 750 mg/ (Levofloxacin/Dextrose)  300 mls @ 100 mls/hr IV 

Q24H [facility]


   Stop: 01/07/21 09:00


   Last Admin: 01/03/21 10:10 Dose:  100 mls/hr


   Documented by: 


Sodium Chloride (Normal Saline)  500 mls @ 100 mls/hr IV .BOLUS ONE


   Stop: 01/03/21 13:44


   Last Admin: 01/03/21 09:10 Dose:  500 mls/hr


   Documented by: 


Levofloxacin/Dextrose (Levaquin In D5w 750 Mg/150 Ml) Confirm Administered Dose 

150 mls @ as directed IV .STK-MED ONE


   Stop: 01/03/21 09:54


   Last Admin: 01/03/21 10:10 Dose:  Not Given


   Documented by: 


Sodium Chloride (Normal Saline)  1,000 mls @ 75 mls/hr IV ASDIRECTED [facility]


   Last Admin: 01/03/21 11:40 Dose:  75 mls/hr


   Documented by: 


Levofloxacin/Dextrose 750 mg/ (Levofloxacin/Dextrose)  300 mls @ 100 mls/hr IV 

Q48H [facility]


   Stop: 01/07/21 09:00


Loperamide HCl (Imodium)  4 mg PO ONETIME ONE


   Stop: 01/03/21 08:49


   Last Admin: 01/03/21 09:17 Dose:  4 mg


   Documented by: 


Magnesium Oxide (Magnesium Oxide)  400 mg PO ONETIME ONE


   Stop: 01/06/21 11:20


   Last Admin: 01/06/21 14:18 Dose:  400 mg


   Documented by: 


Magnesium Oxide (Magnesium Oxide) Confirm Administered Dose 400 mg .ROUTE .STK-

MED ONE


   Stop: 01/06/21 14:19


   Last Admin: 01/06/21 14:23 Dose:  Not Given


   Documented by: 


Meclizine HCl (Antivert)  12.5 mg PO ONETIME ONE


   Stop: 01/06/21 09:46


   Last Admin: 01/06/21 10:41 Dose:  12.5 mg


   Documented by: 


Ondansetron HCl (Zofran) Confirm Administered Dose 4 mg .ROUTE .STK-MED ONE


   Stop: 01/02/21 19:13


   Last Admin: 01/02/21 19:05 Dose:  Not Given


   Documented by: 











- Exam


Quality Assessment: Supplemental Oxygen


General: Alert, Oriented, Cooperative, No Acute Distress


HEENT: Pupils Equal


Lungs: Clear to Auscultation, Normal Respiratory Effort


Cardiovascular: Regular Rate, Regular Rhythm


GI/Abdominal Exam: Normal Bowel Sounds, Soft, Non-Tender


Back Exam: Normal Inspection


Extremities: Normal Inspection, No Pedal Edema


Skin: Warm, Dry


Neurological: No New Focal Deficit


Psy/Mental Status: Alert, Normal Affect, Normal Mood





Sepsis Event Note





- Evaluation


Sepsis Screening Result: No Definite Risk





- Focused Exam


Vital Signs: 


                                   Vital Signs











  Temp Pulse Pulse Resp BP BP Pulse Ox


 


 01/08/21 09:11   71    140/70  


 


 01/08/21 07:00  36.2 C   71  17   140/70  100


 


 01/08/21 03:00  36.3 C   77  20   120/68  93 L


 


 01/07/21 23:00  36.3 C   88  20   119/67  90 L














- Problem List & Annotations


(1) Tachycardia


SNOMED Code(s): 7420816


   Code(s): R00.0 - TACHYCARDIA, UNSPECIFIED   Status: Acute   Priority: Medium 

 Current Visit: Yes   





(2) Acute respiratory failure with hypoxia


SNOMED Code(s): 86123096, 197701191


   Code(s): J96.01 - ACUTE RESPIRATORY FAILURE WITH HYPOXIA   Status: Acute   

Priority: High   Current Visit: Yes   





(3) Pneumonia


SNOMED Code(s): 116868079


   Code(s): J18.9 - PNEUMONIA, UNSPECIFIED ORGANISM   Status: Acute   Priority: 

High   Current Visit: Yes   


Qualifiers: 


   Pneumonia type: due to unspecified organism   Laterality: right   Lung 

location: lower lobe of lung   Qualified Code(s): J18.9 - Pneumonia, unspecified

organism   





(4) Congestive heart failure


SNOMED Code(s): 34199567


   Code(s): I50.9 - HEART FAILURE, UNSPECIFIED   Status: Acute   Priority: 

Medium   Current Visit: No   Onset Date: ~03/19/18   


Qualifiers: 


   Heart failure type: systolic   Heart failure chronicity: acute on chronic   

Qualified Code(s): I50.23 - Acute on chronic systolic (congestive) heart failure

  


Annotation/Comment:: 3/19/18 - 1. Congestive heart failure, 2. Weakness, 3. Mild

dehydration   





(5) Syncope and collapse


SNOMED Code(s): 106557955


   Code(s): R55 - SYNCOPE AND COLLAPSE   Status: Acute   Priority: Medium   

Current Visit: Yes   





(6) NUNO (acute kidney injury)


SNOMED Code(s): 62503371, 76650713


   Code(s): N17.9 - ACUTE KIDNEY FAILURE, UNSPECIFIED   Status: Acute   

Priority: Medium   Current Visit: Yes   





- Problem List Review


Problem List Initiated/Reviewed/Updated: Yes





- My Orders


Last 24 Hours: 


My Active Orders





01/07/21 11:18


Meclizine [Antivert]   12.5 mg PO DAILY PRN 





01/07/21 13:14


EKG Documentation Completion [RC] ASDIRECTED 





01/08/21 10:49


BASIC METABOLIC PANEL,BMP [CHEM] Routine 














- Plan


Plan:: 








1- PNA: continue Levaquin 750 mg PO q 36-48 hrs due to CKD.


2- Acute Respiratory failure with Acute hypoxia: secondary to pneumonia and CHF.

 O2 Supply to keep SpO2 >94%.  diuretics were started 1/4 but held off on 1/7 

due to an episode of hypotension and syncope. albuterol nebs PRN. incentive 

spirometry. 


3- Acute on Chronic CHF: she does need diuretics but holding on this till Cr/GFR

 improves. daily weights. 


4- Sinus tachycardia: EKG showed NSR tachycardia. probably 2/2 PNA and CHF. Home

 dose of atenolol was started on 1/06


5- Vertigo and dizziness:  Meclizine 12.5mg PO as needed


6- Syncope on 1/7 - resolved- : probably 2/2 cardiac event, NSTEMI, or 

dehydration from diuresis. continue to hold on diuretics. and continue to 

monitor. 





Dispo:


, PT and OT eval for need for placement, DMEs and medical ass

istance once stable .

## 2021-01-09 NOTE — PCM.PN
- General Info


Date of Service: 01/09/21


Admission Dx/Problem (Free Text): 





pt admitted for weakness, dehydration, pneumonia, CHF exacerbation





Subjective Update: 





patient reports she is feeling better.


her SOB is improving


Sleeping better and eating well/


No CP, but still gets dizzy spell in the morning, for which meclizine helps a 

lot.


No change in weight 


still requiring 4-5 lit O2 by NC


Functional Status: Reports: Pain Controlled, Tolerating Diet, Incentive Dix

metry





- Review of Systems


General: Reports: No Symptoms


HEENT: Reports: No Symptoms


Pulmonary: Reports: Other (exertional dyspnea)


Gastrointestinal: Reports: No Symptoms


Genitourinary: Reports: No Symptoms


Skin: Reports: No Symptoms


Neurological: Reports: No Symptoms


Psychiatric: Reports: No Symptoms





- Patient Data


Vitals - Most Recent: 


                                Last Vital Signs











Temp  36.6 C   01/09/21 08:00


 


Pulse  68   01/09/21 08:16


 


Resp  18   01/09/21 08:00


 


BP  142/46 H  01/09/21 08:16


 


Pulse Ox  95   01/09/21 08:00











Weight - Most Recent: 77.678 kg


I&O - Last 24 Hours: 


                                 Intake & Output











 01/08/21 01/09/21 01/09/21





 22:59 06:59 14:59


 


Intake Total 760 300 


 


Output Total 625 650 


 


Balance 135 -350 











Lab Results Last 24 Hours: 


                         Laboratory Results - last 24 hr











  01/09/21 Range/Units





  10:00 


 


Sodium  137  (136-145)  mmol/L


 


Potassium  5.0  (3.5-5.1)  mmol/L


 


Chloride  101  ()  mmol/L


 


Carbon Dioxide  31.7  (21.0-32.0)  mmol/L


 


Anion Gap  9.3  (5.0-15.0)  mmol/L


 


BUN  30 H  (8-26)  mg/dL


 


Creatinine  1.65 H  (0.55-1.02)  mg/dL


 


Est Cr Clr Drug Dosing  19.00  mL/min


 


Estimated GFR (MDRD)  29 L  (>60)  MLS/MIN


 


BUN/Creatinine Ratio  18.2  (6-25)  


 


Glucose  115 H  ()  mg/dL


 


Calcium  8.6  (8.5-10.1)  mg/dL











Donn Results Last 24 Hours: 


                                  Microbiology











 01/02/21 18:34 Salmonella/Shigella Screen - Final





 Rectum Campylobacter Culture - Preliminary





 Escherichia coli Shiga Toxins EIA - Final











Med Orders - Current: 


                               Current Medications





Acetaminophen (Tylenol Arthritis Pain)  1,300 mg PO TID PRN


   PRN Reason: Pain


   Last Admin: 01/09/21 11:32 Dose:  1,300 mg


   Documented by: 


Albuterol (Proventil Neb Soln)  2.5 mg NEB Q4H PRN


   PRN Reason: short of breath


   Last Admin: 01/06/21 08:48 Dose:  2.5 mg


   Documented by: 


Aspirin (Aspirin)  81 mg PO DAILY [facility]


   Last Admin: 01/09/21 08:15 Dose:  81 mg


   Documented by: 


Atenolol (Tenormin)  12.5 mg PO DAILY [facility]


Atorvastatin Calcium (Lipitor)  10 mg PO BEDTIME [facility]


   Last Admin: 01/08/21 19:33 Dose:  10 mg


   Documented by: 


Calamine/Phenol (Calmoseptine)  0 gm TOP QID PRN


   PRN Reason: Rash


   Last Admin: 01/03/21 10:12 Dose:  1 applic


   Documented by: 


Levofloxacin (Levaquin)  750 mg PO Q48H [facility]


   Last Admin: 01/09/21 08:16 Dose:  750 mg


   Documented by: 


Nystatin (Nystop)  1 gm TOP BID [facility]


   Last Admin: 01/09/21 08:16 Dose:  1 applic


   Documented by: 


Ondansetron HCl (Zofran)  4 mg IVPUSH Q6H PRN


   PRN Reason: Nausea/Vomiting


   Last Admin: 01/07/21 09:54 Dose:  4 mg


   Documented by: 


Pantoprazole Sodium (Protonix***)  40 mg PO ACBREAKFAST [facility]


   Last Admin: 01/09/21 08:15 Dose:  40 mg


   Documented by: 


Sodium Chloride (Saline Flush)  10 ml FLUSH ASDIRECTED PRN


   PRN Reason: Keep Vein Open


   Last Admin: 01/06/21 19:42 Dose:  10 ml


   Documented by: 


Tiotropium Bromide (Spiriva Handihaler)  18 mcg INH DAILY [facility]


   Last Admin: 01/09/21 08:16 Dose:  1 puff


   Documented by: 


Trolamine Salicylate (Aspercreme 10%)  0 gm TOP Q1H PRN


   PRN Reason: Other


   Last Admin: 01/03/21 14:13 Dose:  1 applic


   Documented by: 





Discontinued Medications





Acetaminophen (Tylenol Arthritis Pain)  1,300 mg PO TID [facility]


   Last Admin: 01/03/21 18:58 Dose:  Not Given


   Documented by: 


Albuterol/Ipratropium (Duoneb 3.0-0.5 Mg/3 Ml) Confirm Administered Dose 3 ml 

.ROUTE .[facility]-MED ONE


   Stop: 01/02/21 19:05


   Last Admin: 01/02/21 19:30 Dose:  Not Given


   Documented by: 


Albuterol/Ipratropium (Duoneb 3.0-0.5 Mg/3 Ml)  3 ml NEB Q2H ONE


   Stop: 01/02/21 19:01


   Last Admin: 01/02/21 19:00 Dose:  3 ml


   Documented by: 


Atenolol (Tenormin)  25 mg PO DAILY [facility]


   Last Admin: 01/09/21 08:16 Dose:  25 mg


   Documented by: 


Furosemide (Lasix)  20 mg IVPUSH ONETIME ONE


   Stop: 01/03/21 16:11


   Last Admin: 01/03/21 16:40 Dose:  20 mg


   Documented by: 


Furosemide (Lasix)  20 mg PO ONETIME ONE


   Stop: 01/05/21 12:24


   Last Admin: 01/05/21 13:40 Dose:  20 mg


   Documented by: 


Furosemide (Lasix)  40 mg IVPUSH NOW ONE


   Stop: 01/05/21 16:21


   Last Admin: 01/05/21 16:31 Dose:  40 mg


   Documented by: 


Furosemide (Lasix)  20 mg IVPUSH BIDDIURETIC [facility]


   Last Admin: 01/06/21 07:43 Dose:  20 mg


   Documented by: 


Furosemide (Lasix)  40 mg PO BID [facility]


Furosemide (Lasix)  40 mg PO BID@0800,1400 [facility]


   Last Admin: 01/07/21 13:41 Dose:  Not Given


   Documented by: 


Sodium Chloride (Normal Saline)  1,000 mls @ 75 mls/hr IV ASDIRECTED [facility]


   Last Admin: 01/03/21 10:08 Dose:  100 mls/hr


   Documented by: 


Levofloxacin/Dextrose 750 mg/ (Levofloxacin/Dextrose)  300 mls @ 100 mls/hr IV 

Q24H [facility]


   Stop: 01/07/21 09:00


   Last Admin: 01/03/21 10:10 Dose:  100 mls/hr


   Documented by: 


Sodium Chloride (Normal Saline)  500 mls @ 100 mls/hr IV .BOLUS ONE


   Stop: 01/03/21 13:44


   Last Admin: 01/03/21 09:10 Dose:  500 mls/hr


   Documented by: 


Levofloxacin/Dextrose (Levaquin In D5w 750 Mg/150 Ml) Confirm Administered Dose 

150 mls @ as directed IV .STK-MED ONE


   Stop: 01/03/21 09:54


   Last Admin: 01/03/21 10:10 Dose:  Not Given


   Documented by: 


Sodium Chloride (Normal Saline)  1,000 mls @ 75 mls/hr IV ASDIRECTED [facility]


   Last Admin: 01/03/21 11:40 Dose:  75 mls/hr


   Documented by: 


Levofloxacin/Dextrose 750 mg/ (Levofloxacin/Dextrose)  300 mls @ 100 mls/hr IV 

Q48H [facility]


   Stop: 01/07/21 09:00


Loperamide HCl (Imodium)  4 mg PO ONETIME ONE


   Stop: 01/03/21 08:49


   Last Admin: 01/03/21 09:17 Dose:  4 mg


   Documented by: 


Magnesium Oxide (Magnesium Oxide)  400 mg PO ONETIME ONE


   Stop: 01/06/21 11:20


   Last Admin: 01/06/21 14:18 Dose:  400 mg


   Documented by: 


Magnesium Oxide (Magnesium Oxide) Confirm Administered Dose 400 mg .ROUTE .STK-

MED ONE


   Stop: 01/06/21 14:19


   Last Admin: 01/06/21 14:23 Dose:  Not Given


   Documented by: 


Meclizine HCl (Antivert)  12.5 mg PO ONETIME ONE


   Stop: 01/06/21 09:46


   Last Admin: 01/06/21 10:41 Dose:  12.5 mg


   Documented by: 


Meclizine HCl (Antivert)  12.5 mg PO DAILY PRN


   PRN Reason: Dizziness


   Last Admin: 01/09/21 10:20 Dose:  12.5 mg


   Documented by: 


Ondansetron HCl (Zofran) Confirm Administered Dose 4 mg .ROUTE .STK-MED ONE


   Stop: 01/02/21 19:13


   Last Admin: 01/02/21 19:05 Dose:  Not Given


   Documented by: 











- Exam


Quality Assessment: Supplemental Oxygen


General: Alert, Oriented, Cooperative, No Acute Distress


HEENT: Pupils Equal, Pupils Reactive, EOMI


Neck: Supple


Lungs: Normal Respiratory Effort.  No: Wheezing


Cardiovascular: Regular Rate, Regular Rhythm


GI/Abdominal Exam: Normal Bowel Sounds, Soft, Non-Tender


Back Exam: Normal Inspection


Extremities: Normal Inspection, Normal Range of Motion, Non-Tender, No Pedal 

Edema.  No: Pedal Edema





Sepsis Event Note





- Evaluation


Sepsis Screening Result: No Definite Risk





- Focused Exam


Vital Signs: 


                                   Vital Signs











  Temp Pulse Pulse Resp BP BP Pulse Ox


 


 01/09/21 08:16   68    142/46 H  


 


 01/09/21 08:00  36.6 C   68  18   142/46 H  95


 


 01/09/21 06:28    64  20    92 L


 


 01/09/21 03:00  36.6 C   69  18   106/48 L  90 L














- Problem List & Annotations


(1) Tachycardia


SNOMED Code(s): 7436089


   Code(s): R00.0 - TACHYCARDIA, UNSPECIFIED   Status: Acute   Priority: Medium 

 Current Visit: Yes   





(2) Acute respiratory failure with hypoxia


SNOMED Code(s): 17623735, 924277474


   Code(s): J96.01 - ACUTE RESPIRATORY FAILURE WITH HYPOXIA   Status: Acute   

Priority: High   Current Visit: Yes   





(3) Pneumonia


SNOMED Code(s): 358968198


   Code(s): J18.9 - PNEUMONIA, UNSPECIFIED ORGANISM   Status: Acute   Priority: 

High   Current Visit: Yes   


Qualifiers: 


   Pneumonia type: due to unspecified organism   Laterality: right   Lung 

location: lower lobe of lung   Qualified Code(s): J18.9 - Pneumonia, unspecified

organism   





(4) Congestive heart failure


SNOMED Code(s): 37769204


   Code(s): I50.9 - HEART FAILURE, UNSPECIFIED   Status: Acute   Priority: 

Medium   Current Visit: No   Onset Date: ~03/19/18   


Qualifiers: 


   Heart failure type: systolic   Heart failure chronicity: acute on chronic   

Qualified Code(s): I50.23 - Acute on chronic systolic (congestive) heart failure

  


Annotation/Comment:: 3/19/18 - 1. Congestive heart failure, 2. Weakness, 3. Mild

dehydration   





(5) Syncope and collapse


SNOMED Code(s): 751590039


   Code(s): R55 - SYNCOPE AND COLLAPSE   Status: Acute   Priority: Medium   

Current Visit: Yes   





(6) NUNO (acute kidney injury)


SNOMED Code(s): 26809304, 36155788


   Code(s): N17.9 - ACUTE KIDNEY FAILURE, UNSPECIFIED   Status: Acute   

Priority: Medium   Current Visit: Yes   





- Problem List Review


Problem List Initiated/Reviewed/Updated: Yes





- My Orders


Last 24 Hours: 


My Active Orders





01/08/21 20:00


Nystatin [Nystop]   1 gm TOP BID 





01/10/21 08:00


atenoloL [Tenormin]   12.5 mg PO DAILY 














- Plan


Plan:: 








1- PNA: continue Levaquin 750 mg PO q 36-48 hrs due to CKD.


2- Acute Respiratory failure with Acute hypoxia: secondary to pneumonia and CHF.

 O2 Supply to keep SpO2 >94%.  diuretics were started on 1/4 but held off on 1/7

 due to an episode of hypotension and syncope. albuterol nebs PRN. incentive 

spirometry. She is on 4 lit of O2 at home.





3- Acute on Chronic CHF: she does need diuretics but holding on this till Cr/GFR

 improves. daily weights. daily check of kidney function. 


4- Sinus tachycardia: EKG showed NSR tachycardia. probably 2/2 PNA and CHF. Home

 dose of atenolol was started on 1/06. Will decreased to 12.5mg PO ON 1/10/21


5- Vertigo and dizziness:  Meclizine 12.5mg PO as needed


6- Syncope on 1/7 - resolved- : probably 2/2 cardiac event, NSTEMI, or 

dehydration from diuresis. continue to hold on diuretics. and continue to 

monitor. 





Dispo:


, PT and OT eval for need for placement, DMEs and medical 

assistance once stable .

## 2021-01-11 NOTE — PCM.DCSUM1
**Discharge Summary





- Hospital Course


Diagnosis: Stroke: No





- Discharge Data


Discharge Date: 01/11/21


Discharge Disposition: DC/Tfer W/I Hosp To [facility]


Condition: Fair





- Referral to Home Health


Primary Care Physician: 


PCP None








- Discharge Diagnosis/Problem(s)


(1) Tachycardia


SNOMED Code(s): 2483180


   ICD Code: R00.0 - TACHYCARDIA, UNSPECIFIED   Status: Acute   Priority: Medium

  





(2) Acute respiratory failure with hypoxia


SNOMED Code(s): 21524377, 722690981


   ICD Code: J96.01 - ACUTE RESPIRATORY FAILURE WITH HYPOXIA   Status: Acute   

Priority: High   





(3) Pneumonia


SNOMED Code(s): 878214664


   ICD Code: J18.9 - PNEUMONIA, UNSPECIFIED ORGANISM   Status: Acute   Priority:

High   


Qualifiers: 


   Pneumonia type: due to unspecified organism   Laterality: right   Lung 

location: lower lobe of lung   Qualified Code(s): J18.9 - Pneumonia, unspecified

organism   





(4) Congestive heart failure


SNOMED Code(s): 18035774


   ICD Code: I50.9 - HEART FAILURE, UNSPECIFIED   Status: Acute   Priority: 

Medium   Onset Date: ~03/19/18   Problem Details: 3/19/18 - 1. Congestive heart 

failure, 2. Weakness, 3. Mild dehydration   


Qualifiers: 


   Heart failure type: systolic   Heart failure chronicity: acute on chronic   

Qualified Code(s): I50.23 - Acute on chronic systolic (congestive) heart failure

  





(5) Syncope and collapse


SNOMED Code(s): 065489365


   ICD Code: R55 - SYNCOPE AND COLLAPSE   Status: Acute   Priority: Medium   





(6) NUNO (acute kidney injury)


SNOMED Code(s): 19639154, 44615234


   ICD Code: N17.9 - ACUTE KIDNEY FAILURE, UNSPECIFIED   Status: Acute   

Priority: Medium   





- Patient Summary/Data


Consults: 


                                  Consultations





01/05/21 16:45


OT Evaluation and Treatment [CONS] Routine 


   Please Evaluate and Treat.


   OT Reason for Consult: Discharge Planning


   This query below is only for informational purposes and is not editable.


   Admission Diagnosis/Problem: Pneumonia


PT Evaluation and Treatment [CONS] Routine 


   Please Evaluate and Treat.


   PT Reason for Consult: Strengthening


   This query below is only for informational purposes and is not editable.


   Admission Diagnosis/Problem: Pneumonia














- Patient Instructions


Diet: Heart Healthy Diet, Low Sodium


Fluid Restriction: 1500 mL


Activity: As Tolerated, No Strenuous Activities


Driving: Do Not Drive


Showering/Bathing: May Shower





- Discharge Plan


*PRESCRIPTION DRUG MONITORING PROGRAM REVIEWED*: Not Applicable


*COPY OF PRESCRIPTION DRUG MONITORING REPORT IN PATIENT HEMAL: No


Prescriptions/Med Rec: 


Fluticasone/Salmeterol [Advair 100-50] 1 puff IH BID 30 Days #1 diskus


Furosemide [Lasix] 20 mg PO DAILY #30 tab


levoFLOXacin [Levaquin] 750 mg PO Q48H #3 tablet


Tiotropium [Spiriva HandiHaler] 18 mcg INH DAILY #30 cap


Home Medications: 


                                    Home Meds





Acetaminophen [Tylenol Arthritis] 2 tab PO TID 01/14/17 [History]


Aspirin [Melchor Chewable Aspirin] 81 mg PO DAILY 01/14/17 [History]


atenoloL [Tenormin] 25 mg PO DAILY 01/14/17 [History]


Omeprazole 20 mg PO DAILY 09/11/19 [History]


atorvaSTATin [Lipitor] 10 mg PO BEDTIME 01/03/21 [History]


Fluticasone/Salmeterol [Advair 100-50] 1 puff IH BID 30 Days #1 diskus 01/05/21 

[Rx]


Furosemide [Lasix] 20 mg PO DAILY #30 tab 01/05/21 [Rx]


Tiotropium [Spiriva HandiHaler] 18 mcg INH DAILY #30 cap 01/05/21 [Rx]


levoFLOXacin [Levaquin] 750 mg PO Q48H #3 tablet 01/05/21 [Rx]








Oxygen Therapy Mode: Nasal Cannula


Oxygen Flow Rate (L/min): 92


Patient Handouts:  Furosemide tablets, Levofloxacin tablets





- Discharge Summary/Plan Comment


DC Time >30 min.: Yes


Discharge Summary/Plan Comment: 





patient was initially admitted for treatment of pneumonia and CHF. 


h/o HTN, CHF and severe COPD. Requiring 4lit of O2 at home.








Patient was started on levaquin and diuresis. Was COVID negative. She also went 

aggressive pulm toilet therapy. She responded well to her therapy.


Diuretics were put on hold temp after an episode of syncope and hypotension. 

Responded well to 500 ml of IVF. 


Patient didn't receive diuretics since the 1/9 and luckily hasn't been gaining 

any weight. She is still on atenolol which is her home meds. The dose was 

decreased by half. 





Patients has responded well to therapy. Reports her breathing has improved and 

been using her incentive spirometry. 





Was switched to swing bed to continue her rehab under close observation 


 





- General Info


Date of Service: 01/11/21


Admission Dx/Problem (Free Text: 





pt admitted for weakness, dehydration, pneumonia, CHF exacerbation





Subjective Update: 





patient reports she is feeling better.


her SOB is improving


Sleeping better and eating well/


No CP, but still gets dizzy spell in the morning, for which meclizine helps a 

lot.


No change in weight 


still requiring 4-5 lit O2 by NC


Functional Status: Reports: Pain Controlled, Tolerating Diet, New Symptoms, 

Incentive Spirometry





- Review of Systems


General: Reports: No Symptoms


HEENT: Reports: No Symptoms


Pulmonary: Reports: Shortness of Breath


Cardiovascular: Reports: Dyspnea on Exertion, Edema


Gastrointestinal: Reports: No Symptoms


Genitourinary: Reports: No Symptoms


Musculoskeletal: Reports: No Symptoms


Skin: Reports: No Symptoms


Neurological: Reports: No Symptoms





- Patient Data


Vitals - Most Recent: 


                                Last Vital Signs











Temp  36.7 C   01/11/21 08:00


 


Pulse  77   01/11/21 08:44


 


Resp  16   01/11/21 08:00


 


BP  112/66   01/11/21 08:44


 


Pulse Ox  95   01/11/21 08:00











Weight - Most Recent: 78.199 kg


I&O - Last 24 hours: 


                                 Intake & Output











 01/10/21 01/11/21 01/11/21





 22:59 06:59 14:59


 


Intake Total 450 200 


 


Output Total 300 750 


 


Balance 150 -550 











EKTA Results - Last 24 hrs: 


                                  Microbiology











 01/02/21 18:34 Salmonella/Shigella Screen - Final





 Rectum Campylobacter Culture - Final





 Escherichia coli Shiga Toxins EIA - Final











Med Orders - Current: 


                               Current Medications





Acetaminophen (Tylenol Arthritis Pain)  1,300 mg PO TID PRN


   PRN Reason: Pain


   Last Admin: 01/10/21 19:44 Dose:  1,300 mg


   Documented by: 


Albuterol (Proventil Neb Soln)  2.5 mg NEB Q4H PRN


   PRN Reason: short of breath


   Last Admin: 01/06/21 08:48 Dose:  2.5 mg


   Documented by: 


Aspirin (Aspirin)  81 mg PO DAILY [facility]


   Last Admin: 01/11/21 08:43 Dose:  81 mg


   Documented by: 


Atenolol (Tenormin)  6.25 mg PO DAILY [facility]


Atorvastatin Calcium (Lipitor)  10 mg PO BEDTIME [facility]


   Last Admin: 01/10/21 19:44 Dose:  10 mg


   Documented by: 


Calamine/Phenol (Calmoseptine)  0 gm TOP QID PRN


   PRN Reason: Rash


   Last Admin: 01/03/21 10:12 Dose:  1 applic


   Documented by: 


Fluticasone Propionate (Flonase)  1 gm NASBOTH DAILY [facility]


   Last Admin: 01/11/21 08:43 Dose:  1 applic


   Documented by: 


Levofloxacin (Levaquin)  750 mg PO Q48H [facility]


   Last Admin: 01/09/21 08:16 Dose:  750 mg


   Documented by: 


Loratadine (Claritin)  5 mg PO DAILY [facility]


   Last Admin: 01/11/21 08:43 Dose:  5 mg


   Documented by: 


Meclizine HCl (Antivert)  12.5 mg PO DAILY PRN


   PRN Reason: Dizziness


Nystatin (Nystop)  1 gm TOP BID [facility]


   Last Admin: 01/11/21 08:44 Dose:  1 applic


   Documented by: 


Ondansetron HCl (Zofran)  4 mg IVPUSH Q6H PRN


   PRN Reason: Nausea/Vomiting


   Last Admin: 01/07/21 09:54 Dose:  4 mg


   Documented by: 


Pantoprazole Sodium (Protonix***)  40 mg PO ACBREAKFAST [facility]


   Last Admin: 01/11/21 08:43 Dose:  40 mg


   Documented by: 


Sodium Chloride (Saline Flush)  10 ml FLUSH ASDIRECTED PRN


   PRN Reason: Keep Vein Open


   Last Admin: 01/06/21 19:42 Dose:  10 ml


   Documented by: 


Tiotropium Bromide (Spiriva Handihaler)  18 mcg INH DAILY [facility]


   Last Admin: 01/11/21 08:44 Dose:  1 puff


   Documented by: 


Trolamine Salicylate (Aspercreme 10%)  0 gm TOP Q1H PRN


   PRN Reason: Other


   Last Admin: 01/03/21 14:13 Dose:  1 applic


   Documented by: 





Discontinued Medications





Acetaminophen (Tylenol Arthritis Pain)  1,300 mg PO TID [facility]


   Last Admin: 01/03/21 18:58 Dose:  Not Given


   Documented by: 


Albuterol/Ipratropium (Duoneb 3.0-0.5 Mg/3 Ml) Confirm Administered Dose 3 ml 

.ROUTE .[facility]-MED ONE


   Stop: 01/02/21 19:05


   Last Admin: 01/02/21 19:30 Dose:  Not Given


   Documented by: 


Albuterol/Ipratropium (Duoneb 3.0-0.5 Mg/3 Ml)  3 ml NEB Q2H ONE


   Stop: 01/02/21 19:01


   Last Admin: 01/02/21 19:00 Dose:  3 ml


   Documented by: 


Atenolol (Tenormin)  25 mg PO DAILY [facility]


   Last Admin: 01/09/21 08:16 Dose:  25 mg


   Documented by: 


Atenolol (Tenormin)  12.5 mg PO DAILY [facility]


   Last Admin: 01/11/21 08:44 Dose:  12.5 mg


   Documented by: 


Fluticasone Propionate (Flonase) Confirm Administered Dose 16 gm .ROUTE .STK-MED

 ONE


   Stop: 01/10/21 11:51


   Last Admin: 01/10/21 19:22 Dose:  Not Given


   Documented by: 


Furosemide (Lasix)  20 mg IVPUSH ONETIME ONE


   Stop: 01/03/21 16:11


   Last Admin: 01/03/21 16:40 Dose:  20 mg


   Documented by: 


Furosemide (Lasix)  20 mg PO ONETIME ONE


   Stop: 01/05/21 12:24


   Last Admin: 01/05/21 13:40 Dose:  20 mg


   Documented by: 


Furosemide (Lasix)  40 mg IVPUSH NOW ONE


   Stop: 01/05/21 16:21


   Last Admin: 01/05/21 16:31 Dose:  40 mg


   Documented by: 


Furosemide (Lasix)  20 mg IVPUSH BIDDIURETIC [facility]


   Last Admin: 01/06/21 07:43 Dose:  20 mg


   Documented by: 


Furosemide (Lasix)  40 mg PO BID [facility]


Furosemide (Lasix)  40 mg PO BID@0800,1400 [facility]


   Last Admin: 01/07/21 13:41 Dose:  Not Given


   Documented by: 


Sodium Chloride (Normal Saline)  1,000 mls @ 75 mls/hr IV ASDIRECTED [facility]


   Last Admin: 01/03/21 10:08 Dose:  100 mls/hr


   Documented by: 


Levofloxacin/Dextrose 750 mg/ (Levofloxacin/Dextrose)  300 mls @ 100 mls/hr IV 

Q24H [facility]


   Stop: 01/07/21 09:00


   Last Admin: 01/03/21 10:10 Dose:  100 mls/hr


   Documented by: 


Sodium Chloride (Normal Saline)  500 mls @ 100 mls/hr IV .BOLUS ONE


   Stop: 01/03/21 13:44


   Last Admin: 01/03/21 09:10 Dose:  500 mls/hr


   Documented by: 


Levofloxacin/Dextrose (Levaquin In D5w 750 Mg/150 Ml) Confirm Administered Dose 

150 mls @ as directed IV .STK-MED ONE


   Stop: 01/03/21 09:54


   Last Admin: 01/03/21 10:10 Dose:  Not Given


   Documented by: 


Sodium Chloride (Normal Saline)  1,000 mls @ 75 mls/hr IV ASDIRECTED [facility]


   Last Admin: 01/03/21 11:40 Dose:  75 mls/hr


   Documented by: 


Levofloxacin/Dextrose 750 mg/ (Levofloxacin/Dextrose)  300 mls @ 100 mls/hr IV 

Q48H [facility]


   Stop: 01/07/21 09:00


Loperamide HCl (Imodium)  4 mg PO ONETIME ONE


   Stop: 01/03/21 08:49


   Last Admin: 01/03/21 09:17 Dose:  4 mg


   Documented by: 


Loratadine (Claritin)  5 mg PO DAILY [facility]


   Last Admin: 01/10/21 19:22 Dose:  Not Given


   Documented by: 


Magnesium Oxide (Magnesium Oxide)  400 mg PO ONETIME ONE


   Stop: 01/06/21 11:20


   Last Admin: 01/06/21 14:18 Dose:  400 mg


   Documented by: 


Magnesium Oxide (Magnesium Oxide) Confirm Administered Dose 400 mg .ROUTE .STK-

MED ONE


   Stop: 01/06/21 14:19


   Last Admin: 01/06/21 14:23 Dose:  Not Given


   Documented by: 


Meclizine HCl (Antivert)  12.5 mg PO ONETIME ONE


   Stop: 01/06/21 09:46


   Last Admin: 01/06/21 10:41 Dose:  12.5 mg


   Documented by: 


Meclizine HCl (Antivert)  12.5 mg PO DAILY PRN


   PRN Reason: Dizziness


   Last Admin: 01/09/21 10:20 Dose:  12.5 mg


   Documented by: 


Meclizine HCl (Antivert) Confirm Administered Dose 25 mg .ROUTE .STK-MED ONE


   Stop: 01/10/21 08:44


   Last Admin: 01/10/21 11:02 Dose:  Not Given


   Documented by: 


Ondansetron HCl (Zofran) Confirm Administered Dose 4 mg .ROUTE .STK-MED ONE


   Stop: 01/02/21 19:13


   Last Admin: 01/02/21 19:05 Dose:  Not Given


   Documented by: 











- Exam


Quality Assessment: Reports: Supplemental Oxygen


General: Reports: Alert, Oriented, Cooperative


HEENT: Reports: Pupils Equal, Pupils Reactive


Lungs: Reports: Clear to Auscultation, Normal Respiratory Effort


Cardiovascular: Reports: Regular Rate, Irregular Rhythm


GI/Abdominal Exam: Normal Bowel Sounds, Soft, Non-Tender


Extremities: Normal Inspection


Neurological: Reports: No New Focal Deficit


Psy/Mental Status: Reports: Alert, Normal Affect, Normal Mood

## 2021-01-18 NOTE — PCM.PN
- General Info


Date of Service: 01/18/21


Subjective Update: 





Patient doing well and has no concerns. She denies any chest pain at this time. 

She has increased shortness of breath on exertion. She has been working with 

PT/OT as well. No issues at this time.


Functional Status: Reports: Pain Controlled, Tolerating Diet, Ambulating





- Review of Systems


General: Reports: Weakness, Fatigue


HEENT: Reports: No Symptoms


Pulmonary: Reports: Shortness of Breath (Only on exertion)


Cardiovascular: Reports: No Symptoms


Gastrointestinal: Reports: No Symptoms


Musculoskeletal: Reports: No Symptoms


Neurological: Reports: Difficulty Walking, Weakness


Psychiatric: Reports: No Symptoms





- Patient Data


Vitals - Most Recent: 


                                Last Vital Signs











Temp  35.8 C L  01/17/21 10:00


 


Pulse  87   01/17/21 10:00


 


Resp  18   01/17/21 10:00


 


BP  114/54 L  01/17/21 10:00


 


Pulse Ox  95   01/17/21 10:00











Weight - Most Recent: 77.746 kg


Med Orders - Current: 


                               Current Medications





Acetaminophen (Tylenol Arthritis Pain)  1,300 mg PO TID PRN


   PRN Reason: Pain


   Last Admin: 01/17/21 19:41 Dose:  1,300 mg


   Documented by: 


Albuterol (Proventil Neb Soln)  2.5 mg NEB Q4H PRN


   PRN Reason: short of breath


   Last Admin: 01/16/21 19:23 Dose:  2.5 mg


   Documented by: 


Aspirin (Aspirin)  81 mg PO DAILY [facility]


   Last Admin: 01/17/21 08:56 Dose:  81 mg


   Documented by: 


Atenolol (Tenormin)  6.25 mg PO DAILY [facility]


   Last Admin: 01/17/21 08:54 Dose:  Not Given


   Documented by: 


Atorvastatin Calcium (Lipitor)  10 mg PO Q48H [facility]


Calamine/Phenol (Calmoseptine)  0 gm TOP QID PRN


   PRN Reason: Rash


   Last Admin: 01/17/21 00:14 Dose:  1 applic


   Documented by: 


Fluticasone Propionate (Flonase)  1 gm NASBOTH DAILY [facility]


   Last Admin: 01/17/21 08:57 Dose:  2 puff


   Documented by: 


Heparin Sodium (Porcine) (Heparin Sodium)  5,000 units SUBCUT BID [facility]


   Last Admin: 01/17/21 19:44 Dose:  5,000 units


   Documented by: 


Loratadine (Claritin)  5 mg PO DAILY [facility]


   Last Admin: 01/17/21 08:56 Dose:  5 mg


   Documented by: 


Magnesium Hydroxide (Milk Of Magnesia)  30 ml PO DAILY PRN


   PRN Reason: Constipation


   Last Admin: 01/16/21 16:52 Dose:  30 ml


   Documented by: 


Meclizine HCl (Antivert)  12.5 mg PO DAILY PRN


   PRN Reason: DIZZINESS


Nystatin (Nystop)  1 gm TOP BID PRN


   PRN Reason: Rash


Ondansetron HCl (Zofran)  4 mg IVPUSH Q6H PRN


   PRN Reason: Nausea/Vomiting


Pantoprazole Sodium (Protonix***)  40 mg PO ACBREAKFAST [facility]


   Last Admin: 01/18/21 06:24 Dose:  40 mg


   Documented by: 


Polyethylene Glycol (Miralax)  17 gm PO DAILY [facility]


   Last Admin: 01/17/21 08:56 Dose:  17 gm


   Documented by: 


Sodium Chloride (Saline Flush)  10 ml FLUSH ASDIRECTED PRN


   PRN Reason: Keep Vein Open


Sodium Chloride (Saline Flush)  10 ml FLUSH ASDIRECTED PRN


   PRN Reason: Keep Vein Open


Tiotropium Bromide (Spiriva Handihaler)  18 mcg INH DAILY [facility]


   Last Admin: 01/17/21 08:57 Dose:  1 puff


   Documented by: 


Trolamine Salicylate (Aspercreme 10%)  1 gm TOP Q1H PRN


   PRN Reason: Other





Discontinued Medications





Atorvastatin Calcium (Lipitor)  10 mg PO BEDTIME [facility]


   Last Admin: 01/17/21 19:57 Dose:  Not Given


   Documented by: 


Bisacodyl (Dulcolax)  10 mg RECTAL ONETIME ONE


   Stop: 01/16/21 13:45


   Last Admin: 01/16/21 13:54 Dose:  10 mg


   Documented by: 


Bisacodyl (Dulcolax) Confirm Administered Dose 10 mg .ROUTE .STK-MED ONE


   Stop: 01/16/21 13:49


   Last Admin: 01/16/21 13:54 Dose:  Not Given


   Documented by: 


Docusate Sodium (Colace)  100 mg PO ONETIME ONE


   Stop: 01/15/21 17:37


   Last Admin: 01/15/21 17:49 Dose:  100 mg


   Documented by: 


Heparin Sodium (Porcine) (Heparin Sodium)  5,000 units SUBCUT Q12H [facility]


   Last Admin: 01/12/21 03:44 Dose:  Not Given


   Documented by: 


Levofloxacin (Levaquin)  750 mg PO Q48H [facility]


Levofloxacin (Levaquin)  750 mg PO Q36H [facility]


   Stop: 01/13/21 01:28


   Last Admin: 01/11/21 15:01 Dose:  Not Given


   Documented by: 


Levofloxacin (Levaquin)  750 mg PO Q36H [facility]


   Stop: 01/14/21 20:01


   Last Admin: 01/14/21 19:37 Dose:  750 mg


   Documented by: 


Meclizine HCl (Antivert)  12.5 mg PO DAILY PRN


   PRN Reason: Dizziness


   Last Admin: 01/15/21 10:44 Dose:  12.5 mg


   Documented by: 


Meclizine HCl (Antivert) Confirm Administered Dose 25 mg .ROUTE .STK-MED ONE


   Stop: 01/12/21 08:45


   Last Admin: 01/12/21 18:58 Dose:  Not Given


   Documented by: 


Meclizine HCl (Antivert) Confirm Administered Dose 25 mg .ROUTE .STK-MED ONE


   Stop: 01/13/21 08:35


   Last Admin: 01/13/21 08:37 Dose:  Not Given


   Documented by: 


Nystatin (Nystop)  1 gm TOP BID [facility]


   Last Admin: 01/17/21 19:52 Dose:  Not Given


   Documented by: 


Sodium Biphosphate/Sodium Phosphate (Fleet Enema)  133 ml RECTAL ONETIME ONE


   Stop: 01/16/21 16:41


   Last Admin: 01/16/21 17:01 Dose:  1 bottle


   Documented by: 


Tuberculin PPD (Aplisol)  5 unit IDERM ONETIME ONE


   Stop: 01/16/21 17:53


   Last Admin: 01/16/21 18:40 Dose:  5 unit


   Documented by: 











- Exam


General: Alert, Cooperative


HEENT: Pupils Equal, Pupils Reactive, EOMI


Neck: Supple


Lungs: Clear to Auscultation, Normal Respiratory Effort


Cardiovascular: Regular Rate, Regular Rhythm


GI/Abdominal Exam: Normal Bowel Sounds


Psy/Mental Status: Alert, Normal Affect, Normal Mood





Sepsis Event Note





- Evaluation


Sepsis Screening Result: No Definite Risk





- Problem List & Annotations


(1) Acute respiratory failure with hypoxia


SNOMED Code(s): 85100271, 502940667


   Code(s): J96.01 - ACUTE RESPIRATORY FAILURE WITH HYPOXIA   Status: Acute   

Priority: High   Current Visit: No   





(2) Congestive heart failure


SNOMED Code(s): 85257603


   Code(s): I50.9 - HEART FAILURE, UNSPECIFIED   Status: Acute   Priority: 

Medium   Current Visit: No   Onset Date: ~03/19/18   


Qualifiers: 


 


Annotation/Comment:: 3/19/18 - 1. Congestive heart failure, 2. Weakness, 3. Mild

dehydration   





(3) Pneumonia


SNOMED Code(s): 790898289


   Code(s): J18.9 - PNEUMONIA, UNSPECIFIED ORGANISM   Status: Acute   Priority: 

High   Current Visit: No   


Qualifiers: 


 





(4) Tachycardia


SNOMED Code(s): 3142976


   Code(s): R00.0 - TACHYCARDIA, UNSPECIFIED   Status: Acute   Priority: Medium 

 Current Visit: No   





(5) Weakness


SNOMED Code(s): 43872169


   Code(s): R53.1 - WEAKNESS   Status: Acute   Current Visit: No   Onset Date: 

~03/19/18   Annotation/Comment:: 3/19/18 - 1. Congestive heart failure, 2. 

Weakness, 3. Mild dehydration   





- Problem List Review


Problem List Initiated/Reviewed/Updated: Yes





- Plan


Plan:: 





Patient plan on discharge to Care center. Discharge planning today. No changes 

to care or medications at this time.

## 2021-01-19 NOTE — PCM.DCSUM1
**Discharge Summary





- Discharge Data


Discharge Date: 01/18/21


Discharge Disposition: DC/Tfer to [facility]


Condition: Good





- Referral to Home Health


Primary Care Physician: 


PCP None








- Discharge Diagnosis/Problem(s)


(1) Acute respiratory failure with hypoxia


SNOMED Code(s): 36842276, 629923278


   ICD Code: J96.01 - ACUTE RESPIRATORY FAILURE WITH HYPOXIA   Status: Acute   

Priority: High   





(2) Congestive heart failure


SNOMED Code(s): 25266868


   ICD Code: I50.9 - HEART FAILURE, UNSPECIFIED   Status: Acute   Priority: 

Medium   Onset Date: ~03/19/18   Problem Details: 3/19/18 - 1. Congestive heart 

failure, 2. Weakness, 3. Mild dehydration   


Qualifiers: 


 





(3) Pneumonia


SNOMED Code(s): 477773969


   ICD Code: J18.9 - PNEUMONIA, UNSPECIFIED ORGANISM   Status: Acute   Priority:

High   


Qualifiers: 


 





(4) Tachycardia


SNOMED Code(s): 8421862


   ICD Code: R00.0 - TACHYCARDIA, UNSPECIFIED   Status: Acute   Priority: Medium

  





(5) Weakness


SNOMED Code(s): 36157765


   ICD Code: R53.1 - WEAKNESS   Status: Acute   Onset Date: ~03/19/18   Problem 

Details: 3/19/18 - 1. Congestive heart failure, 2. Weakness, 3. Mild dehydration

  





- Patient Summary/Data


Consults: 


                                  Consultations





01/11/21 14:12


OT Evaluation and Treatment [CONS] Routine 


   Please Evaluate and Treat.


   OT Reason for Consult: Discharge Planning


   This query below is only for informational purposes and is not editable.


   Admission Diagnosis/Problem: Pneumonia


PT Evaluation and Treatment [CONS] Routine 


   Please Evaluate and Treat.


   PT Reason for Consult: Strengthening


   This query below is only for informational purposes and is not editable.


   Admission Diagnosis/Problem: Pneumonia














- Patient Instructions


Activity: As Tolerated


Driving: Do Not Drive





- Discharge Plan


Home Medications: 


                                    Home Meds





Acetaminophen [Tylenol Arthritis] 2 tab PO TID 01/14/17 [History]


Aspirin [Melchor Chewable Aspirin] 81 mg PO DAILY 01/14/17 [History]


atenoloL [Tenormin] 25 mg PO DAILY 01/14/17 [History]


Omeprazole 20 mg PO DAILY 09/11/19 [History]


atorvaSTATin [Lipitor] 10 mg PO BEDTIME 01/03/21 [History]


Fluticasone/Salmeterol [Advair 100-50] 1 puff IH BID 30 Days #1 diskus 01/05/21 

[Rx]


Furosemide [Lasix] 20 mg PO DAILY #30 tab 01/05/21 [Rx]


Tiotropium [Spiriva HandiHaler] 18 mcg INH DAILY #30 cap 01/05/21 [Rx]


levoFLOXacin [Levaquin] 750 mg PO Q48H #3 tablet 01/05/21 [Rx]








Patient Handouts:  Fall Prevention in the Home, Adult, Easy-to-Read





- Discharge Summary/Plan Comment


DC Time >30 min.: No


Discharge Summary/Plan Comment: 





Patient discharged to [facility] for rehabilitation and care. 





- Patient Data


Vitals - Most Recent: 


                                Last Vital Signs











Temp  36.6 C   01/18/21 10:00


 


Pulse  87   01/18/21 10:00


 


Resp  18   01/18/21 10:00


 


BP  116/55 L  01/18/21 10:00


 


Pulse Ox  98   01/18/21 10:00











Weight - Most Recent: 77.281 kg


Lab Results - Last 24 hrs: 


                         Laboratory Results - last 24 hr











  01/18/21 Range/Units





  12:15 


 


SARS-CoV-2 RNA (DIAZ)  Negative  (NEGATIVE)  











Med Orders - Current: 


                               Current Medications








Discontinued Medications





Acetaminophen (Tylenol Arthritis Pain)  1,300 mg PO TID PRN


   PRN Reason: Pain


   Last Admin: 01/18/21 09:45 Dose:  1,300 mg


   Documented by: 


Albuterol (Proventil Neb Soln)  2.5 mg NEB Q4H PRN


   PRN Reason: short of breath


   Last Admin: 01/16/21 19:23 Dose:  2.5 mg


   Documented by: 


Aspirin (Aspirin)  81 mg PO DAILY [facility]


   Last Admin: 01/18/21 08:38 Dose:  81 mg


   Documented by: 


Atenolol (Tenormin)  6.25 mg PO DAILY [facility]


   Last Admin: 01/18/21 08:44 Dose:  6.25 mg


   Documented by: 


Atorvastatin Calcium (Lipitor)  10 mg PO BEDTIME [facility]


   Last Admin: 01/17/21 19:57 Dose:  Not Given


   Documented by: 


Atorvastatin Calcium (Lipitor)  10 mg PO Q48H [facility]


Bisacodyl (Dulcolax)  10 mg RECTAL ONETIME ONE


   Stop: 01/16/21 13:45


   Last Admin: 01/16/21 13:54 Dose:  10 mg


   Documented by: 


Bisacodyl (Dulcolax) Confirm Administered Dose 10 mg .ROUTE .STK-MED ONE


   Stop: 01/16/21 13:49


   Last Admin: 01/16/21 13:54 Dose:  Not Given


   Documented by: 


Calamine/Phenol (Calmoseptine)  0 gm TOP QID PRN


   PRN Reason: Rash


   Last Admin: 01/17/21 00:14 Dose:  1 applic


   Documented by: 


Docusate Sodium (Colace)  100 mg PO ONETIME ONE


   Stop: 01/15/21 17:37


   Last Admin: 01/15/21 17:49 Dose:  100 mg


   Documented by: 


Fluticasone Propionate (Flonase)  1 gm NASBOTH DAILY [facility]


   Last Admin: 01/18/21 08:37 Dose:  2 spray


   Documented by: 


Heparin Sodium (Porcine) (Heparin Sodium)  5,000 units SUBCUT Q12H [facility]


   Last Admin: 01/12/21 03:44 Dose:  Not Given


   Documented by: 


Heparin Sodium (Porcine) (Heparin Sodium)  5,000 units SUBCUT BID [facility]


   Last Admin: 01/18/21 08:35 Dose:  5,000 units


   Documented by: 


Levofloxacin (Levaquin)  750 mg PO Q48H [facility]


Levofloxacin (Levaquin)  750 mg PO Q36H [facility]


   Stop: 01/13/21 01:28


   Last Admin: 01/11/21 15:01 Dose:  Not Given


   Documented by: 


Levofloxacin (Levaquin)  750 mg PO Q36H [facility]


   Stop: 01/14/21 20:01


   Last Admin: 01/14/21 19:37 Dose:  750 mg


   Documented by: 


Loratadine (Claritin)  5 mg PO DAILY [facility]


   Last Admin: 01/18/21 08:38 Dose:  5 mg


   Documented by: 


Magnesium Hydroxide (Milk Of Magnesia)  30 ml PO DAILY PRN


   PRN Reason: Constipation


   Last Admin: 01/16/21 16:52 Dose:  30 ml


   Documented by: 


Meclizine HCl (Antivert)  12.5 mg PO DAILY PRN


   PRN Reason: Dizziness


   Last Admin: 01/15/21 10:44 Dose:  12.5 mg


   Documented by: 


Meclizine HCl (Antivert) Confirm Administered Dose 25 mg .ROUTE .STK-MED ONE


   Stop: 01/12/21 08:45


   Last Admin: 01/12/21 18:58 Dose:  Not Given


   Documented by: 


Meclizine HCl (Antivert) Confirm Administered Dose 25 mg .ROUTE .STK-MED ONE


   Stop: 01/13/21 08:35


   Last Admin: 01/13/21 08:37 Dose:  Not Given


   Documented by: 


Meclizine HCl (Antivert)  12.5 mg PO DAILY PRN


   PRN Reason: DIZZINESS


Nystatin (Nystop)  1 gm TOP BID [facility]


   Last Admin: 01/17/21 19:52 Dose:  Not Given


   Documented by: 


Nystatin (Nystop)  1 gm TOP BID PRN


   PRN Reason: Rash


Ondansetron HCl (Zofran)  4 mg IVPUSH Q6H PRN


   PRN Reason: Nausea/Vomiting


Pantoprazole Sodium (Protonix***)  40 mg PO ACBREAKFAST [facility]


   Last Admin: 01/18/21 06:24 Dose:  40 mg


   Documented by: 


Polyethylene Glycol (Miralax)  17 gm PO DAILY [facility]


   Last Admin: 01/18/21 08:53 Dose:  17 gm


   Documented by: 


Sodium Biphosphate/Sodium Phosphate (Fleet Enema)  133 ml RECTAL ONETIME ONE


   Stop: 01/16/21 16:41


   Last Admin: 01/16/21 17:01 Dose:  1 bottle


   Documented by: 


Sodium Chloride (Saline Flush)  10 ml FLUSH ASDIRECTED PRN


   PRN Reason: Keep Vein Open


Sodium Chloride (Saline Flush)  10 ml FLUSH ASDIRECTED PRN


   PRN Reason: Keep Vein Open


Tiotropium Bromide (Spiriva Handihaler)  18 mcg INH DAILY [facility]


   Last Admin: 01/18/21 08:37 Dose:  1 puff


   Documented by: 


Trolamine Salicylate (Aspercreme 10%)  1 gm TOP Q1H PRN


   PRN Reason: Other


Tuberculin PPD (Aplisol)  5 unit IDERM ONETIME ONE


   Stop: 01/16/21 17:53


   Last Admin: 01/16/21 18:40 Dose:  5 unit


   Documented by:

## 2021-03-07 NOTE — EDM.PDOC
ED HPI GENERAL MEDICAL PROBLEM





- General


Chief Complaint: Respiratory Problem


Stated Complaint: shortness of breath


Time Seen by Provider: 03/07/21 15:00


Source of Information: Reports: Patient


History Limitations: Reports: No Limitations





- History of Present Illness


INITIAL COMMENTS - FREE TEXT/NARRATIVE: 





Mrs. Rodriguez is an [id number] who presents with SOB. She has a chronic history of 

COPD, Emphysema, ASHD. She is a resident of the [facility]. IT appears she has 

not been using her inhaled steroids as previously prescribed which may have 

atributed to her exacerbation.    


Onset: Gradual, Other (chronic illness)


Duration: Chronic


Location: Reports: Chest


Quality: Reports: Other (SOB)


Severity: Moderate


Improves with: Reports: Other (NEb, Advair)


Worsens with: Reports: Movement


Associated Symptoms: Reports: Shortness of Breath





- Related Data


                                    Allergies











Allergy/AdvReac Type Severity Reaction Status Date / Time


 


amoxicillin Allergy  Cannot Verified 03/07/21 14:25





   Remember  


 


Penicillins Allergy  Cannot Verified 03/07/21 14:25





   Remember  











Home Meds: 


                                    Home Meds





Acetaminophen [Tylenol Arthritis] 2 tab PO TID 01/14/17 [History]


Aspirin [Melchor Chewable Aspirin] 81 mg PO DAILY 01/14/17 [History]


atenoloL [Tenormin] 25 mg PO DAILY 01/14/17 [History]


Omeprazole 20 mg PO DAILY 09/11/19 [History]


atorvaSTATin [Lipitor] 10 mg PO BEDTIME 01/03/21 [History]


Fluticasone/Salmeterol [Advair 100-50] 1 puff IH BID 30 Days #1 diskus 01/05/21 

[Rx]


Furosemide [Lasix] 20 mg PO DAILY #30 tab 01/05/21 [Rx]


Tiotropium [Spiriva HandiHaler] 18 mcg INH DAILY #30 cap 01/05/21 [Rx]


levoFLOXacin [Levaquin] 750 mg PO Q48H #3 tablet 01/05/21 [Rx]











Past Medical History


HEENT History: Reports: Allergic Rhinitis, Cataract, Impaired Vision, Macular 

Degeneration


Cardiovascular History: Reports: High Cholesterol, Hypertension


Other Cardiovascular History: 3cm aortic bulging.


Respiratory History: Reports: Asthma, Bronchitis, Recurrent, COPD, Pneumonia, 

Recurrent


Gastrointestinal History: Reports: GERD, Hemorrhoids, Hiatal Hernia


Genitourinary History: Reports: UTI, Recurrent


OB/GYN History: Reports: Pregnancy


Musculoskeletal History: Reports: Arthritis, Back Pain, Chronic, Fracture, 

Osteoarthritis





- Infectious Disease History


Infectious Disease History: Reports: C-Difficile, Chicken Pox, Measles, Mumps, 

Shingles





- Past Surgical History


HEENT Surgical History: Reports: Cataract Surgery, Radiocarotid Ectomy


Cardiovascular Surgical History: Reports: Carotid Endarterectomy


Respiratory Surgical History: Reports: None


GI Surgical History: Reports: Colonoscopy


Musculoskeletal Surgical History: Reports: Other (See Below)


Other Musculoskeletal Surgeries/Procedures:: PLATE IN RIGHT ANKLE





Social & Family History





- Family History


Family Medical History: No Pertinent Family History


OBGYN: Reports: None


Neurological: Reports: None


Psychiatric: Reports: None


Endocrine/Metabolic: Reports: None


Hematologic: Reports: None


Oncologic: Reports: Breast





- Tobacco Use


Tobacco Use Status *Q: Never Tobacco User





- Caffeine Use


Caffeine Use: Reports: Coffee





- Recreational Drug Use


Recreational Drug Use: No





ED ROS GENERAL





- Review of Systems


Review Of Systems: Comprehensive ROS is negative, except as noted in HPI.


Respiratory: Reports: Shortness of Breath





ED EXAM, GENERAL





- Physical Exam


Exam: See Below


Exam Limited By: No Limitations


General Appearance: Alert, Mild Distress


Eye Exam: Bilateral Eye: PERRL


Nose: Normal Inspection, Normal Mucosa


Throat/Mouth: Normal Inspection, Normal Lips


Head: Atraumatic, Normocephalic


Neck: Normal Inspection, Supple, Non-Tender


Respiratory/Chest: Decreased Breath Sounds, Crackles, Rales


Cardiovascular: Normal Peripheral Pulses, Regular Rate, Rhythm


Peripheral Pulses: 2+: Brachial (L), Brachial (R)


GI/Abdominal: Normal Bowel Sounds, Soft, Non-Tender


Back Exam: Normal Inspection


Extremities: Normal Inspection, Normal Range of Motion


Neurological: Alert, Oriented, CN II-XII Intact, Normal Cognition


Psychiatric: Normal Affect, Normal Mood


Skin Exam: Warm, Dry, Intact, Normal Color


Lymphatic: No Adenopathy





Course





- Vital Signs


Last Recorded V/S: 


                                Last Vital Signs











Temp  36.6 C   03/07/21 14:20


 


Pulse  94   03/07/21 14:26


 


Resp  24 H  03/07/21 14:26


 


BP  148/96 H  03/07/21 14:20


 


Pulse Ox  96   03/07/21 14:26














- Orders/Labs/Meds


Orders: 


                               Active Orders 24 hr











 Category Date Time Status


 


 EKG Documentation Completion [RC] ASDIRECTED Care  03/07/21 15:25 Active


 


 RT Aerosol Therapy [RC] ASDIRECTED Care  03/07/21 15:04 Active


 


 Chest w Cont [CT] Stat Exams  03/07/21 14:40 Taken


 


 CULTURE URINE [RM] Stat Lab  03/07/21 16:11 Received


 


 Iopamidol [Isovue-300 (61%)] Med  03/07/21 15:30 Active





 100 ml IV .AS DIRECTED   


 


 Levofloxacin/Dextrose 5%-Water [Levaquin in D5W 750 MG/ Med  03/07/21 16:34 

Active





 150 ML] 750 mg   





 Levofloxacin/Dextrose 5%-Water [Levaquin in D5W 750 MG/   





 150 ML] 150 ml   





 IV ONETIME   








                                Medication Orders





Levofloxacin/Dextrose 750 mg/ (Levofloxacin/Dextrose)  300 mls @ 100 mls/hr IV 

ONETIME ONE


   Stop: 03/07/21 19:33


   Last Admin: 03/07/21 16:43 Dose:  100 mls/hr


   Documented by: 


Iopamidol (Isovue-300 (61%))  100 ml IV .AS DIRECTED [facility]








Labs: 


                                Laboratory Tests











  03/07/21 03/07/21 03/07/21 Range/Units





  14:38 14:38 14:38 


 


WBC  12.1 H    (4.0-11.0)  K/uL


 


RBC  4.46    (3.80-5.80)  M/uL


 


Hgb  12.2    (11.5-16.5)  g/dL


 


Hct  40.6    (37.0-47.0)  %


 


MCV  91    (76-96)  fL


 


MCH  27.4    (27.0-32.0)  pg


 


MCHC  30.0 L    (31.0-35.0)  g/dL


 


RDW  15.2    (11.0-16.0)  %


 


Plt Count  277    (150-500)  K/uL


 


MPV  8.9    (6.0-10.0)  fL


 


Neut % (Auto)  78.0 H    (45.0-70.0)  %


 


Lymph % (Auto)  10.1 L    (20.0-40.0)  %


 


Mono % (Auto)  8.3    (3.0-10.0)  %


 


Eos % (Auto)  3.4    (1.0-5.0)  %


 


Baso % (Auto)  0.2    (0.0-0.5)  %


 


Neut # (Auto)  9.46 H    (2.00-7.50)  K/uL


 


Lymph # (Auto)  1.22 L    (1.50-4.00)  K/uL


 


Mono # (Auto)  1.00 H    (0.20-0.80)  K/uL


 


Eos # (Auto)  0.41 H    (0.04-0.40)  K/uL


 


Baso # (Auto)  0.02    (0.02-0.10)  K/uL


 


Sodium   136   (136-145)  mmol/L


 


Potassium   4.9   (3.5-5.1)  mmol/L


 


Chloride   100   ()  mmol/L


 


Carbon Dioxide   29.2   (21.0-32.0)  mmol/L


 


Anion Gap   11.7   (5.0-15.0)  mmol/L


 


BUN   27 H   (8-26)  mg/dL


 


Creatinine   0.97  D   (0.55-1.02)  mg/dL


 


Est Cr Clr Drug Dosing   32.32   mL/min


 


Estimated GFR (MDRD)   54 L   (>60)  MLS/MIN


 


BUN/Creatinine Ratio   27.8 H   (6-25)  


 


Glucose   120 H   ()  mg/dL


 


Calcium   9.1   (8.5-10.1)  mg/dL


 


Total Bilirubin   0.5  D   (0.0-1.0)  mg/dL


 


AST   51 H   (15-37)  U/L


 


ALT   16   (12-78)  U/L


 


Alkaline Phosphatase   111   ()  U/L


 


Total Protein   8.3 H   (6.4-8.2)  g/dL


 


Albumin   3.5   (3.4-5.0)  g/dL


 


Globulin   4.8 H   (2.2-4.2)  g/dL


 


Albumin/Globulin Ratio   0.7 L   (0.8-2.0)  


 


Urine Color     


 


Urine Appearance     (CLEAR)  


 


Urine pH     (5.0-8.0)  


 


Ur Specific Gravity     (1.003-1.030)  


 


Urine Protein     (NEGATIVE)  mg/dL


 


Urine Glucose (UA)     (NEGATIVE)  mg/dL


 


Urine Ketones     (NEGATIVE)  mg/dL


 


Urine Occult Blood     (NEGATIVE)  


 


Urine Nitrite     (NEGATIVE)  


 


Urine Bilirubin     (NEGATIVE)  


 


Urine Urobilinogen     (0.2-1.0)  E.U./dL


 


Ur Leukocyte Esterase     (NEGATIVE)  


 


Urine RBC     /HPF


 


Urine WBC     /HPF


 


Ur Squamous Epith Cells     /HPF


 


Urine Bacteria     /HPF


 


SARS CoV-2 RNA Rapid DIAZ    Negative  














  03/07/21 Range/Units





  16:11 


 


WBC   (4.0-11.0)  K/uL


 


RBC   (3.80-5.80)  M/uL


 


Hgb   (11.5-16.5)  g/dL


 


Hct   (37.0-47.0)  %


 


MCV   (76-96)  fL


 


MCH   (27.0-32.0)  pg


 


MCHC   (31.0-35.0)  g/dL


 


RDW   (11.0-16.0)  %


 


Plt Count   (150-500)  K/uL


 


MPV   (6.0-10.0)  fL


 


Neut % (Auto)   (45.0-70.0)  %


 


Lymph % (Auto)   (20.0-40.0)  %


 


Mono % (Auto)   (3.0-10.0)  %


 


Eos % (Auto)   (1.0-5.0)  %


 


Baso % (Auto)   (0.0-0.5)  %


 


Neut # (Auto)   (2.00-7.50)  K/uL


 


Lymph # (Auto)   (1.50-4.00)  K/uL


 


Mono # (Auto)   (0.20-0.80)  K/uL


 


Eos # (Auto)   (0.04-0.40)  K/uL


 


Baso # (Auto)   (0.02-0.10)  K/uL


 


Sodium   (136-145)  mmol/L


 


Potassium   (3.5-5.1)  mmol/L


 


Chloride   ()  mmol/L


 


Carbon Dioxide   (21.0-32.0)  mmol/L


 


Anion Gap   (5.0-15.0)  mmol/L


 


BUN   (8-26)  mg/dL


 


Creatinine   (0.55-1.02)  mg/dL


 


Est Cr Clr Drug Dosing   mL/min


 


Estimated GFR (MDRD)   (>60)  MLS/MIN


 


BUN/Creatinine Ratio   (6-25)  


 


Glucose   ()  mg/dL


 


Calcium   (8.5-10.1)  mg/dL


 


Total Bilirubin   (0.0-1.0)  mg/dL


 


AST   (15-37)  U/L


 


ALT   (12-78)  U/L


 


Alkaline Phosphatase   ()  U/L


 


Total Protein   (6.4-8.2)  g/dL


 


Albumin   (3.4-5.0)  g/dL


 


Globulin   (2.2-4.2)  g/dL


 


Albumin/Globulin Ratio   (0.8-2.0)  


 


Urine Color  Yellow  


 


Urine Appearance  Cloudy  (CLEAR)  


 


Urine pH  5.5  (5.0-8.0)  


 


Ur Specific Gravity  1.015  (1.003-1.030)  


 


Urine Protein  Negative  (NEGATIVE)  mg/dL


 


Urine Glucose (UA)  Negative  (NEGATIVE)  mg/dL


 


Urine Ketones  Negative  (NEGATIVE)  mg/dL


 


Urine Occult Blood  Trace-intact H  (NEGATIVE)  


 


Urine Nitrite  Negative  (NEGATIVE)  


 


Urine Bilirubin  Negative  (NEGATIVE)  


 


Urine Urobilinogen  0.2  (0.2-1.0)  E.U./dL


 


Ur Leukocyte Esterase  Small H  (NEGATIVE)  


 


Urine RBC  0-5 H  /HPF


 


Urine WBC  5-10 H  /HPF


 


Ur Squamous Epith Cells  Many  /HPF


 


Urine Bacteria  Moderate H  /HPF


 


SARS CoV-2 RNA Rapid DIAZ   











Meds: 


Medications











Generic Name Dose Route Start Last Admin





  Trade Name Freq  PRN Reason Stop Dose Admin


 


Levofloxacin/Dextrose 750 mg/  300 mls @ 100 mls/hr  03/07/21 16:34  03/07/21 

16:43





  Levofloxacin/Dextrose  IV  03/07/21 19:33  100 mls/hr





  ONETIME ONE   Administration


 


Iopamidol  100 ml  03/07/21 15:30 





  Isovue-300 (61%)  IV  





  .AS DIRECTED CORNEL  














Discontinued Medications














Generic Name Dose Route Start Last Admin





  Trade Name Freq  PRN Reason Stop Dose Admin


 


Albuterol/Ipratropium  3 ml  03/07/21 15:02  03/07/21 15:08





  Duoneb 3.0-0.5 Mg/3 Ml  NEB  03/07/21 15:03  3 ml





  ONETIME ONE   Administration


 


Levofloxacin/Dextrose  Confirm  03/07/21 16:39 





  Levaquin In D5w 750 Mg/150 Ml  Administered  03/07/21 16:40 





  Dose  





  150 mls @ as directed  





  IV  





  .STK-MED ONE  


 


Methylprednisolone Sodium Succinate  40 mg  03/07/21 16:19  03/07/21 16:31





  Solu-Medrol  IVPUSH  03/07/21 16:20  40 mg





  ONETIME ONE   Administration


 


Methylprednisolone Sodium Succinate  Confirm  03/07/21 16:33  03/07/21 16:32





  Solu-Medrol  Administered  03/07/21 16:34  Not Given





  Dose  





  40 mg  





  .ROUTE  





  .STK-MED ONE  


 


Sodium Chloride  50 ml  03/07/21 15:25 





  Normal Saline  FLUSH  03/07/21 15:26 





  ONETIME ONE  














Departure





- Departure


Time of Disposition: 17:00


Disposition: Admitted As Inpatient 66


Condition: Good


Clinical Impression: 


 Arteriosclerotic heart disease (ASHD)





Emphysema lung


Qualifiers:


 Emphysema type: centrilobular Qualified Code(s): J43.2 - Centrilobular 

emphysema








- Discharge Information


*PRESCRIPTION DRUG MONITORING PROGRAM REVIEWED*: Not Applicable


Referrals: 


PCP,None [Primary Care Provider] - 


Forms:  ED Department Discharge


Additional Instructions: 


Inrease O2 prn, use re breather. 


Trial of Abx ( 750 Levaquin) for suspected UTI.


Have Dr. Deras follow up next week.


Begin use of Advair.  


Give 40 Solumedrol IV daily, Lasix 20 per day. 


Heart healthy diet 


Vitals Q4









































































































































































































































Sepsis Event Note (ED)





- Evaluation


Sepsis Screening Result: No Definite Risk





- Focused Exam


Vital Signs: 


                                   Vital Signs











  Temp Pulse Resp BP Pulse Ox


 


 03/07/21 14:26   94  24 H   96


 


 03/07/21 14:20  36.6 C  94  24 H  148/96 H  98














- My Orders


Last 24 Hours: 


My Active Orders





03/07/21 14:40


Chest w Cont [CT] Stat 





03/07/21 15:04


RT Aerosol Therapy [RC] ASDIRECTED 





03/07/21 15:25


EKG Documentation Completion [RC] ASDIRECTED 





03/07/21 15:30


Iopamidol [Isovue-300 (61%)]   100 ml IV .AS DIRECTED 





03/07/21 16:11


CULTURE URINE [RM] Stat 





03/07/21 16:34


Levofloxacin/Dextrose 5%-Water [Levaquin in D5W 750 MG/150 ML] 750 mg   

Levofloxacin/Dextrose 5%-Water [Levaquin in D5W 750 MG/150 ML] 150 ml IV ONETIME
















- Assessment/Plan


Last 24 Hours: 


My Active Orders





03/07/21 14:40


Chest w Cont [CT] Stat 





03/07/21 15:04


RT Aerosol Therapy [RC] ASDIRECTED 





03/07/21 15:25


EKG Documentation Completion [RC] ASDIRECTED 





03/07/21 15:30


Iopamidol [Isovue-300 (61%)]   100 ml IV .AS DIRECTED 





03/07/21 16:11


CULTURE URINE [RM] Stat 





03/07/21 16:34


Levofloxacin/Dextrose 5%-Water [Levaquin in D5W 750 MG/150 ML] 750 mg   

Levofloxacin/Dextrose 5%-Water [Levaquin in D5W 750 MG/150 ML] 150 ml IV ONETIME













Assessment:: 





CHF/ASHD


COPD/emphysema 





Plan: 





Inrease O2 as needed at home.


Trial of Abx for suspected UTI


Have Dr. Deras follow up next week.


Begin use of Advair, while at home.

## 2021-03-07 NOTE — PCM.CONSN
- General Info


Date of Service: 03/07/21





- Patient Data


Vitals - Most Recent: 


                                Last Vital Signs











Temp  36.6 C   03/07/21 14:20


 


Pulse  112 H  03/07/21 16:55


 


Resp  24 H  03/07/21 16:55


 


BP  156/60 H  03/07/21 16:55


 


Pulse Ox  10 L  03/07/21 16:55











Weight - Most Recent: 77.111 kg


Lab Results Last 24 Hours: 


                         Laboratory Results - last 24 hr











  03/07/21 03/07/21 03/07/21 Range/Units





  14:38 14:38 14:38 


 


WBC  12.1 H    (4.0-11.0)  K/uL


 


RBC  4.46    (3.80-5.80)  M/uL


 


Hgb  12.2    (11.5-16.5)  g/dL


 


Hct  40.6    (37.0-47.0)  %


 


MCV  91    (76-96)  fL


 


MCH  27.4    (27.0-32.0)  pg


 


MCHC  30.0 L    (31.0-35.0)  g/dL


 


RDW  15.2    (11.0-16.0)  %


 


Plt Count  277    (150-500)  K/uL


 


MPV  8.9    (6.0-10.0)  fL


 


Neut % (Auto)  78.0 H    (45.0-70.0)  %


 


Lymph % (Auto)  10.1 L    (20.0-40.0)  %


 


Mono % (Auto)  8.3    (3.0-10.0)  %


 


Eos % (Auto)  3.4    (1.0-5.0)  %


 


Baso % (Auto)  0.2    (0.0-0.5)  %


 


Neut # (Auto)  9.46 H    (2.00-7.50)  K/uL


 


Lymph # (Auto)  1.22 L    (1.50-4.00)  K/uL


 


Mono # (Auto)  1.00 H    (0.20-0.80)  K/uL


 


Eos # (Auto)  0.41 H    (0.04-0.40)  K/uL


 


Baso # (Auto)  0.02    (0.02-0.10)  K/uL


 


Sodium   136   (136-145)  mmol/L


 


Potassium   4.9   (3.5-5.1)  mmol/L


 


Chloride   100   ()  mmol/L


 


Carbon Dioxide   29.2   (21.0-32.0)  mmol/L


 


Anion Gap   11.7   (5.0-15.0)  mmol/L


 


BUN   27 H   (8-26)  mg/dL


 


Creatinine   0.97  D   (0.55-1.02)  mg/dL


 


Est Cr Clr Drug Dosing   32.32   mL/min


 


Estimated GFR (MDRD)   54 L   (>60)  MLS/MIN


 


BUN/Creatinine Ratio   27.8 H   (6-25)  


 


Glucose   120 H   ()  mg/dL


 


Calcium   9.1   (8.5-10.1)  mg/dL


 


Total Bilirubin   0.5  D   (0.0-1.0)  mg/dL


 


AST   51 H   (15-37)  U/L


 


ALT   16   (12-78)  U/L


 


Alkaline Phosphatase   111   ()  U/L


 


Total Protein   8.3 H   (6.4-8.2)  g/dL


 


Albumin   3.5   (3.4-5.0)  g/dL


 


Globulin   4.8 H   (2.2-4.2)  g/dL


 


Albumin/Globulin Ratio   0.7 L   (0.8-2.0)  


 


Urine Color     


 


Urine Appearance     (CLEAR)  


 


Urine pH     (5.0-8.0)  


 


Ur Specific Gravity     (1.003-1.030)  


 


Urine Protein     (NEGATIVE)  mg/dL


 


Urine Glucose (UA)     (NEGATIVE)  mg/dL


 


Urine Ketones     (NEGATIVE)  mg/dL


 


Urine Occult Blood     (NEGATIVE)  


 


Urine Nitrite     (NEGATIVE)  


 


Urine Bilirubin     (NEGATIVE)  


 


Urine Urobilinogen     (0.2-1.0)  E.U./dL


 


Ur Leukocyte Esterase     (NEGATIVE)  


 


Urine RBC     /HPF


 


Urine WBC     /HPF


 


Ur Squamous Epith Cells     /HPF


 


Urine Bacteria     /HPF


 


SARS CoV-2 RNA Rapid DIAZ    Negative  














  03/07/21 Range/Units





  16:11 


 


WBC   (4.0-11.0)  K/uL


 


RBC   (3.80-5.80)  M/uL


 


Hgb   (11.5-16.5)  g/dL


 


Hct   (37.0-47.0)  %


 


MCV   (76-96)  fL


 


MCH   (27.0-32.0)  pg


 


MCHC   (31.0-35.0)  g/dL


 


RDW   (11.0-16.0)  %


 


Plt Count   (150-500)  K/uL


 


MPV   (6.0-10.0)  fL


 


Neut % (Auto)   (45.0-70.0)  %


 


Lymph % (Auto)   (20.0-40.0)  %


 


Mono % (Auto)   (3.0-10.0)  %


 


Eos % (Auto)   (1.0-5.0)  %


 


Baso % (Auto)   (0.0-0.5)  %


 


Neut # (Auto)   (2.00-7.50)  K/uL


 


Lymph # (Auto)   (1.50-4.00)  K/uL


 


Mono # (Auto)   (0.20-0.80)  K/uL


 


Eos # (Auto)   (0.04-0.40)  K/uL


 


Baso # (Auto)   (0.02-0.10)  K/uL


 


Sodium   (136-145)  mmol/L


 


Potassium   (3.5-5.1)  mmol/L


 


Chloride   ()  mmol/L


 


Carbon Dioxide   (21.0-32.0)  mmol/L


 


Anion Gap   (5.0-15.0)  mmol/L


 


BUN   (8-26)  mg/dL


 


Creatinine   (0.55-1.02)  mg/dL


 


Est Cr Clr Drug Dosing   mL/min


 


Estimated GFR (MDRD)   (>60)  MLS/MIN


 


BUN/Creatinine Ratio   (6-25)  


 


Glucose   ()  mg/dL


 


Calcium   (8.5-10.1)  mg/dL


 


Total Bilirubin   (0.0-1.0)  mg/dL


 


AST   (15-37)  U/L


 


ALT   (12-78)  U/L


 


Alkaline Phosphatase   ()  U/L


 


Total Protein   (6.4-8.2)  g/dL


 


Albumin   (3.4-5.0)  g/dL


 


Globulin   (2.2-4.2)  g/dL


 


Albumin/Globulin Ratio   (0.8-2.0)  


 


Urine Color  Yellow  


 


Urine Appearance  Cloudy  (CLEAR)  


 


Urine pH  5.5  (5.0-8.0)  


 


Ur Specific Gravity  1.015  (1.003-1.030)  


 


Urine Protein  Negative  (NEGATIVE)  mg/dL


 


Urine Glucose (UA)  Negative  (NEGATIVE)  mg/dL


 


Urine Ketones  Negative  (NEGATIVE)  mg/dL


 


Urine Occult Blood  Trace-intact H  (NEGATIVE)  


 


Urine Nitrite  Negative  (NEGATIVE)  


 


Urine Bilirubin  Negative  (NEGATIVE)  


 


Urine Urobilinogen  0.2  (0.2-1.0)  E.U./dL


 


Ur Leukocyte Esterase  Small H  (NEGATIVE)  


 


Urine RBC  0-5 H  /HPF


 


Urine WBC  5-10 H  /HPF


 


Ur Squamous Epith Cells  Many  /HPF


 


Urine Bacteria  Moderate H  /HPF


 


SARS CoV-2 RNA Rapid DIAZ   











Med Orders - Current: 


                               Current Medications





Albuterol/Ipratropium (Duoneb 3.0-0.5 Mg/3 Ml)  3 ml NEB Q4H PRN


   PRN Reason: Congestion


Sodium Chloride (Normal Saline)  1,000 mls @ 75 mls/hr IV ASDIRECTED [facility]


   Stop: 03/08/21 09:01


Iopamidol (Isovue-300 (61%))  100 ml IV .AS DIRECTED CORNEL


Mometasone Furoate/Formoterol Fumar (Dulera 100-5 Mcg)  2 puff IH BID CORNEL


Tiotropium Bromide (Spiriva Handihaler)  18 mcg INH DAILY CORNEL





Discontinued Medications





Albuterol/Ipratropium (Duoneb 3.0-0.5 Mg/3 Ml)  3 ml NEB ONETIME ONE


   Stop: 03/07/21 15:03


   Last Admin: 03/07/21 15:08 Dose:  3 ml


   Documented by: 


Furosemide (Lasix)  20 mg IVPUSH DAILY [facility]


   Last Admin: 03/07/21 16:57 Dose:  20 mg


   Documented by: 


Furosemide (Lasix) Confirm Administered Dose 40 mg .ROUTE .STK-MED ONE


   Stop: 03/07/21 17:04


   Last Admin: 03/07/21 16:56 Dose:  Not Given


   Documented by: 


Levofloxacin/Dextrose 750 mg/ (Levofloxacin/Dextrose)  300 mls @ 100 mls/hr IV 

ONETIME ONE


   Stop: 03/07/21 19:33


   Last Admin: 03/07/21 16:43 Dose:  100 mls/hr


   Documented by: 


Levofloxacin/Dextrose (Levaquin In D5w 750 Mg/150 Ml) Confirm Administered Dose 

150 mls @ as directed IV .STK-MED ONE


   Stop: 03/07/21 16:40


   Last Admin: 03/07/21 16:44 Dose:  Not Given


   Documented by: 


Methylprednisolone Sodium Succinate (Solu-Medrol)  40 mg IVPUSH ONETIME ONE


   Stop: 03/07/21 16:20


   Last Admin: 03/07/21 16:31 Dose:  40 mg


   Documented by: 


Methylprednisolone Sodium Succinate (Solu-Medrol) Confirm Administered Dose 40 

mg .ROUTE .STK-MED ONE


   Stop: 03/07/21 16:34


   Last Admin: 03/07/21 16:32 Dose:  Not Given


   Documented by: 


Sodium Chloride (Normal Saline)  50 ml FLUSH ONETIME ONE


   Stop: 03/07/21 15:26











Sepsis Event Note





- Evaluation


Sepsis Screening Result: No Definite Risk





- Focused Exam


Vital Signs: 


                                   Vital Signs











  Temp Pulse Resp BP Pulse Ox


 


 03/07/21 16:55   112 H  24 H  156/60 H  10 L


 


 03/07/21 16:50   112 H  20  153/84 H  98


 


 03/07/21 14:26   94  24 H   96


 


 03/07/21 14:20  36.6 C  94  24 H  148/96 H  98














Consult PN Assessment/Plan


Procedures: 


Procedures





AGENT NOS ASSAY W/OPTIC (01/05/17)


ASSAY OF BLOOD/URIC ACID (03/21/17)


ASSAY OF CREATININE (05/05/16)


ASSAY OF FERRITIN (01/31/19)


ASSAY OF FOLIC ACID SERUM (01/31/19)


ASSAY OF MAGNESIUM (01/04/21)


ASSAY OF NATRIURETIC PEPTIDE (01/04/21)


ASSAY OF PHOSPHORUS (01/04/21)


ASSAY OF TROPONIN QUANT (01/04/21)


ASSAY THYROID STIM HORMONE (05/16/17)


C DIFF AMPLIFIED PROBE (01/04/21)


CANALITH REPOSITIONING PROC (09/29/15)


CHEST X-RAY 2VW FRONTAL&LATL (01/05/16)


COLONOSCOPY AND BIOPSY (09/12/19)


COLONOSCOPY W/LESION REMOVAL (09/12/19)


COMPLETE CBC AUTOMATED (01/04/21)


COMPLETE CBC W/AUTO DIFF WBC (01/04/21)


COMPREHEN METABOLIC PANEL (01/04/21)


CT ABD & PELVIS W/O CONTRAST (01/04/21)


CT THORAX DX C+ (01/19/16)


CT THORAX DX C- (05/06/16)


DRAIN/INJ JOINT/BURSA W/O US (05/28/19)


ELECTROCARDIOGRAM TRACING (01/04/21)


EMERGENCY DEPT VISIT (11/01/19)


EMERGENCY DEPT VISIT (11/01/19)


EXTRACRANIAL BILAT STUDY (08/17/17)


FECES CULTURE AEROBIC BACT (01/04/21)


HYDRATE IV INFUSION ADD-ON (01/15/17)


HYDRATION IV INFUSION INIT (03/19/18)


INFLUENZA ASSAY W/OPTIC (02/26/18)


INITIAL OBSERVATION CARE (03/19/18)


LIPID PANEL (10/30/18)


METABOLIC PANEL TOTAL CA (01/04/21)


MICROBE SUSCEPTIBLE EKTA (09/22/16)


NEEDLE LOCALIZATION BY XRAY (05/28/19)


OBSERVATION CARE DISCHARGE (03/19/18)


OT EVAL LOW COMPLEX 30 MIN (01/04/21)


OVA AND PARASITES SMEARS (01/05/17)


PT EVAL LOW COMPLEX 20 MIN (01/04/21)


PT EVALUATION (08/28/15)


ROUTINE VENIPUNCTURE (01/04/21)


SHIGA-LIKE TOXIN AG IA (01/04/21)


SMEAR COMPLEX STAIN (01/05/17)


SPECIMEN INFECT AGNT CONCNTJ (01/05/17)


STOOL CULTR AEROBIC BACT EA (01/04/21)


SUBSEQUENT OBSERVATION CARE (03/19/18)


THER/PROPH/DIAG IV INF ADDON (01/15/17)


THER/PROPH/DIAG IV INF INIT (01/15/17)


THERAPEUTIC ACTIVITIES (01/04/21)


THERAPEUTIC EXERCISES (01/04/21)


TISSUE EXAM BY PATHOLOGIST (09/12/19)


TTE W/DOPPLER COMPLETE (10/10/18)


TX/PRO/DX INJ NEW DRUG ADDON (01/15/17)


TX/PRO/DX INJ SAME DRUG ADON (01/15/17)


URINALYSIS AUTO W/O SCOPE (01/04/21)


URINALYSIS AUTO W/SCOPE (11/07/19)


URINE BACTERIA CULTURE (09/22/16)


URINE CULTURE/COLONY COUNT (06/20/19)


US ABDL AORTA SCREEN AAA (10/30/18)


VITAMIN B-12 (01/31/19)


X-RAY EXAM CHEST 1 VIEW (01/04/21)


X-RAY EXAM CHEST 2 VIEWS (02/26/18)


X-RAY EXAM HIPS BI 2 VIEWS (03/19/19)


X-RAY EXAM L-S SPINE 2/3 VWS (10/18/17)


X-RAY EXAM OF HIPS (08/05/15)








Problem List Initiated/Reviewed/Updated: Yes


My Orders Last 24 Hours: 


My Active Orders





03/07/21 20:23


D Dimer [D-DIMER QUANTITATIVE] [COAG] Routine 





03/07/21 20:49


INFLUENZA A+B AG, DFA W/RFLX Routine 


PROCALCITONIN Routine 





03/07/21 21:00


Sodium Chloride 0.9% @  75 MLS/HR(1000ml) Sodium Chloride 0.9% [Normal Saline] 

1,000 ml IV ASDIRECTED 





03/07/21 21:15


B-TYPE NATRIURETIC PEPTIDE,BNP [CHEM] Routine 


TROPONIN I [CHEM] Routine 











Plan: 





Eugenia Saleh Hospitalist CONSULTATION NOTE:


 


eHospitalist was contacted by JOSEFINA Lanier with request of consultation 

for medical management.


 


Reason for consult: Shortness of breath


 


HPI: 


Patient is a 87-year-old female nursing home resident with pmh of HTN, COPD (on 

4 to 5 L O2 at home), chronic diastolic HF who presented to the ED with 

complaints of shortness of breath.  Says she is chronically short of breath but 

has been more so in the last couple of days.  Says she has cough with greenish 

sputum for the last couple weeks.  Denies any fever but endorses chills.  Denies

 any headaches or dizziness, chest pain, abdominal pain, nausea vomiting, 

constipation or diarrhea.  Denies any hematuria or burning micturition.  Says 

she has not been eating or drinking much.


 


Home Medications: Reviewed


 


Pertinent Medical History: HTN, COPD (on 4 to 5 L O2 at home), chronic diastolic

 heart failure, emphysema


 


Pertinent Social History: Former smoker (quit 20 years ago), denies alcohol use,

 uses a walker, nursing home resident


 


Exam (performed via interactive video with assistance of bedside nurse): 


General: Alert, cooperative, no acute distress


HEENT: Oral mucosa dry 


Lungs: Coarse breath sounds heard in right lung base, no wheezing


CV: S1-S2 heard, no murmurs appreciated


Abd: Bowel sounds present


Ext: No pitting edema noted


Skin: Small bruise on left shin (LLE).


Neuro: Alert and oriented x3, moving all extremities


 


-Vitals reviewed


-CBC with WBC 12.1, left shift.  Normal CBC and platelets.


-BMP with electrolytes within acceptable limits


-Lactic acid normal


-UA with 0-5 RBCs, 5-10 WBCs, small leukocyte esterase, negative nitrites


-CT chest negative for PE on my reading, showee interstitial infiltrates and 

consolidation in the right lower lobe


-SARS-CoV-2 rapid RNA test negative


-Echo from October 2018 showed LVEF 65%, mild diastolic dysfunction.  Mild MR





Assessment and Plan: 


Patient is a 87-year-old female with pmh of HTN, COPD (on 4 to 5 L O2 at home), 

emphysema, chronic diastolic heart failure who presented to the ED with 

complaints of shortness of breath.





#Shortness of breath


#Acute on chronic respiratory failure


#Chronic diastolic heart failure-not in exacerbation


#History of COPD, possibly exacerbation


#Emphysema


Does not appear to be volume overloaded.  Chest x-ray showing right lower lobe 

infiltrate concerning for pneumonia.  Dirty UA.  Chronically on 4 to 5 L O2 at 

home, currently requiring the same.


-Appears very dry on exam, start NS at 75 cc/hr X 12 hours.  Reassess volume 

status in a.m.


-Does not appear to be in heart failure exacerbation, hold off on any diuretics 

at this time.


-Tachycardia likely secondary to sepsis, would refrain from using beta-blockers 

to control heart rate.


-Recommend cefepime and doxycycline to cover HCAP.  Received Levaquin in ED, 

reassess antibiotics in AM.


-Ordered procalcitonin, D-dimer, influenza A and B, troponin, BNP.  Follow-up 

results


-Resume home inhalers and have duo nebs available every 4 hours as needed for 

shortness of breath or wheezing.


-Follow-up urine culture.


-Follow-up CT chest with contrast-formal report to rule out PE.


-Wean oxygen as tolerated.


-Was given Solu-Medrol 40 mg IV X1 dose in ED-for possible COPD exacerbation.  

Her oxygen requirements are at her baseline.  Recommend switching to p.o. 

steroids tomorrow.











Thank you for including Eugenia Saleh Hospitalist in the patients care.  This 

service is available for further assistance as requested by your care team by 

calling [phone].

## 2021-03-08 NOTE — CR
DATE OF SERVICE: 03/08/2021



CLINICAL DATA: CRACKLES UPON AUSCULTATION





PA and lateral chest:



Comparison is made to a prior exam dated 6 January 2021.



The patient is in an apical lordotic position.



The heart size is stable. There is calcification of the aortic arch.



The pulmonary vasculature does appear to be more prominent than on the prior 
exam with some

cephalization of flow suggesting mild pulmonary venous congestion.



There is persistent eventration of the right hemidiaphragm with atelectatic 
changes in the right lung

base. The lungs are otherwise clear.



No pneumothorax. No pleural effusions.



There is a mass posterior to the heart containing an air-fluid level consistent 
with a large hiatal hernia.

MTDD

## 2021-03-08 NOTE — CT
Date of Service:  03/07/21

Clinical Data:  shortness of breath



ENHANCED CHEST CT:  



Multislice acquisition through the chest with IV contrast was performed. 



Comparison is made to a prior exam dated 05/0616.  



There is suboptimal contrast opacification of the pulmonary arteries.  No gross 
evidence for PE.  



No pneumothorax.  No pleural effusions.  



No aortic aneurysm or dissection.  



There are emphysematous changes throughout both lungs.  There are atelectatic 
changes within the right middle lobe, lingular segment of the left upper lobe, 
and in both  lung bases.  There are multiple small pleural and subpleural 
nodules.  



The heart size is normal.  There are moderate coronary artery calcifications.  
No significant pericardial effusion.  



There is a large hiatal hernia.  



There is degenerative disk disease throughout the thoracic spine.  



No other significant findings.  



[id number]

[facility]

## 2021-03-08 NOTE — CR
DATE OF SERVICE: 03/08/2021



CLINICAL DATA: CRACKLES UPON AUSCULATION



PA and lateral chest:



Comparison is made to a prior exam dated 6 January 2021.



The patient is in an apical lordotic position.



The heart size is stable. There is calcification of the aortic arch.



The pulmonary vasculature does appear to be more prominent than on the prior 
exam with some

cephalization of flow suggesting mild pulmonary venous congestion.



There is persistent eventration of the right hemidiaphragm with atelectatic 
changes in the right lung

base. The lungs are otherwise clear.



No pneumothorax. No pleural effusions

.

There is a mass posterior to the heart containing an air-fluid level consistent 
with a large hiatal hernia.

MTDD

## 2021-03-08 NOTE — [id number]
**Discharge Summary





- Hospital Course


Free Text/Narrative:: 





COPD-Treatment with NC O2 initiated 4-6L , alternated with NRB as needed in ED 

and upon admission loretta, then weened to straight NC 3-4 L by AM.Treatment with 

Solumedrol 20mg once a day, and Duo Neb with Advair prn. CT evaluation, CXR and 

labs. EKG. 





UTI- Levaquin . NS rate30 to 75 of Fluids, 20mg Lasix. Monitored I/O. U/A,

Culture. 


HPI Initial Comments: 








Mrs. Rodriguez is an [id number] who presents with SOB. She has a chronic history of 

COPD, Emphysema, ASHD. She is a resident of the [facility]. IT appears she has 

not been using her inhaled steroids as previously prescribed which may have 

attributed to her exacerbation.  She is on 3-4 L of O2 daily at home. She is 

seen frequently here for this compliant. No other concerns. SOB episodes are 

worsened by getting out of bed. She appears in no distress at rest. Crackles and

rales can be heard by auscultation. Heart rate is increased with variations of 

tachycardia, exacerbated by movement. Normal heart rhythm. No fever, No 

abdominal pain. No N, V or D. No edema. No HA. Upon observation admission a UTI 

was dx and treatment was initiated.  


Diagnosis: Stroke: No





- Discharge Data


Discharge Date: 03/08/21


Discharge Disposition: DC/[facility] to [facility]


Condition: Good





- Referral to Home Health


Primary Care Physician: 


PCP None








- Discharge Diagnosis/Problem(s)


(1) Emphysema lung


SNOMED Code(s): 61954072


   ICD Code: J43.9 - EMPHYSEMA, UNSPECIFIED   Status: Chronic   Priority: High  

Current Visit: Yes   


Qualifiers: 


   Emphysema type: centrilobular   Qualified Code(s): J43.2 - Centrilobular 

emphysema   





(2) UTI (urinary tract infection)


SNOMED Code(s): 64223664


   ICD Code: N39.0 - URINARY TRACT INFECTION, SITE NOT SPECIFIED   Status: Acute

  Priority: Medium   Current Visit: Yes   


Qualifiers: 


   Urinary tract infection type: acute cystitis 





- Patient Instructions


Diet: Heart Healthy Diet


Activity: As Tolerated


Showering/Bathing: May Shower





- Discharge Plan


*PRESCRIPTION DRUG MONITORING PROGRAM REVIEWED*: Not Applicable


*COPY OF PRESCRIPTION DRUG MONITORING REPORT IN PATIENT HEMAL: No


Home Medications: 


                                    Home Meds





Acetaminophen [Tylenol Arthritis] 1 tab PO BID 01/14/17 [History]


Aspirin [Melchor Chewable Aspirin] 81 mg PO DAILY 01/14/17 [History]


atorvaSTATin [Lipitor] 10 mg PO BEDTIME 01/03/21 [History]


Fluticasone/Salmeterol [Advair 100-50] 1 puff IH BID 30 Days #1 diskus 01/05/21 

[Rx]


Tiotropium [Spiriva HandiHaler] 18 mcg INH DAILY #30 cap 01/05/21 [Rx]


Albuterol/Ipratropium [DuoNeb 3.0-0.5 MG/3 ML] 1 vial INH Q4H 03/07/21 [History]


Calcium Carbonate [Calcium] 1 tab PO DAILY 03/07/21 [History]


Fluticasone Propionate [Flonase] 2 spray INH DAILY 03/07/21 [History]


Loratadine [Claritin] 1 tab PO DAILY 03/07/21 [History]


Meclizine [Antivert] 12.5 mg PO Q24H PRN 03/07/21 [History]


Pantoprazole Sodium [Protonix] 1 tab PO DAILY 03/07/21 [History]


atenoloL [Atenolol] 0.5 tab PO DAILY 03/07/21 [History]








Oxygen Therapy Mode: Nasal Cannula (3-5 L)


Oxygen Flow Rate (L/min): 4


Patient Handouts:  Metered Dose Inhaler (No Spacer Used), Urinary Tract 

Infection, Adult, Easy-to-Read


Forms:  ED Department Discharge


Referrals: 


PCP,None [Primary Care Provider] - 





- Discharge Summary/Plan Comment


DC Time >30 min.: Yes


Discharge Summary/Plan Comment: 





Increase oral fluid intake. 


Start 750 Levaquin PO tomorrow for 3 days q 48. 


Start in AM Medrol dose pack. Use as directed on package. 


Continue O2 NC 4L. 


Continue Advair and Spiriva.


Do Duo Denice PRN. 


Continue Lasix 20mg daily. 





- Patient Data


Vitals - Most Recent: 


                                Last Vital Signs











Temp  36.6 C   03/08/21 08:00


 


Pulse  109 H  03/08/21 09:15


 


Resp  20   03/08/21 08:00


 


BP  143/60 H  03/08/21 09:15


 


Pulse Ox  95   03/08/21 08:00











Weight - Most Recent: 76.748 kg


I&O - Last 24 hours: 


                                 Intake & Output











 03/07/21 03/08/21 03/08/21





 22:59 06:59 14:59


 


Intake Total  1043 


 


Output Total  1600 


 


Balance  -557 











Lab Results - Last 24 hrs: 


                         Laboratory Results - last 24 hr











  03/07/21 03/07/21 03/07/21 Range/Units





  14:38 14:38 14:38 


 


WBC  12.1 H    (4.0-11.0)  K/uL


 


RBC  4.46    (3.80-5.80)  M/uL


 


Hgb  12.2    (11.5-16.5)  g/dL


 


Hct  40.6    (37.0-47.0)  %


 


MCV  91    (76-96)  fL


 


MCH  27.4    (27.0-32.0)  pg


 


MCHC  30.0 L    (31.0-35.0)  g/dL


 


RDW  15.2    (11.0-16.0)  %


 


Plt Count  277    (150-500)  K/uL


 


MPV  8.9    (6.0-10.0)  fL


 


Neut % (Auto)  78.0 H    (45.0-70.0)  %


 


Lymph % (Auto)  10.1 L    (20.0-40.0)  %


 


Mono % (Auto)  8.3    (3.0-10.0)  %


 


Eos % (Auto)  3.4    (1.0-5.0)  %


 


Baso % (Auto)  0.2    (0.0-0.5)  %


 


Neut # (Auto)  9.46 H    (2.00-7.50)  K/uL


 


Lymph # (Auto)  1.22 L    (1.50-4.00)  K/uL


 


Mono # (Auto)  1.00 H    (0.20-0.80)  K/uL


 


Eos # (Auto)  0.41 H    (0.04-0.40)  K/uL


 


Baso # (Auto)  0.02    (0.02-0.10)  K/uL


 


D-Dimer, Quantitative     (0-400)  ng/mL


 


Sodium   136   (136-145)  mmol/L


 


Potassium   4.9   (3.5-5.1)  mmol/L


 


Chloride   100   ()  mmol/L


 


Carbon Dioxide   29.2   (21.0-32.0)  mmol/L


 


Anion Gap   11.7   (5.0-15.0)  mmol/L


 


BUN   27 H   (8-26)  mg/dL


 


Creatinine   0.97  D   (0.55-1.02)  mg/dL


 


Est Cr Clr Drug Dosing   32.32   mL/min


 


Estimated GFR (MDRD)   54 L   (>60)  MLS/MIN


 


BUN/Creatinine Ratio   27.8 H   (6-25)  


 


Glucose   120 H   ()  mg/dL


 


Lactic Acid     (0.4-2.0)  mmol/L


 


Calcium   9.1   (8.5-10.1)  mg/dL


 


Total Bilirubin   0.5  D   (0.0-1.0)  mg/dL


 


AST   51 H   (15-37)  U/L


 


ALT   16   (12-78)  U/L


 


Alkaline Phosphatase   111   ()  U/L


 


Troponin I     (0.000-0.060)  ng/mL


 


B-Natriuretic Peptide     (0-450)  pg/mL


 


Total Protein   8.3 H   (6.4-8.2)  g/dL


 


Albumin   3.5   (3.4-5.0)  g/dL


 


Globulin   4.8 H   (2.2-4.2)  g/dL


 


Albumin/Globulin Ratio   0.7 L   (0.8-2.0)  


 


Urine Color     


 


Urine Appearance     (CLEAR)  


 


Urine pH     (5.0-8.0)  


 


Ur Specific Gravity     (1.003-1.030)  


 


Urine Protein     (NEGATIVE)  mg/dL


 


Urine Glucose (UA)     (NEGATIVE)  mg/dL


 


Urine Ketones     (NEGATIVE)  mg/dL


 


Urine Occult Blood     (NEGATIVE)  


 


Urine Nitrite     (NEGATIVE)  


 


Urine Bilirubin     (NEGATIVE)  


 


Urine Urobilinogen     (0.2-1.0)  E.U./dL


 


Ur Leukocyte Esterase     (NEGATIVE)  


 


Urine RBC     /HPF


 


Urine WBC     /HPF


 


Ur Squamous Epith Cells     /HPF


 


Urine Bacteria     /HPF


 


SARS CoV-2 RNA Rapid DIAZ    Negative  














  03/07/21 03/07/21 03/08/21 Range/Units





  16:11 18:58 08:11 


 


WBC     (4.0-11.0)  K/uL


 


RBC     (3.80-5.80)  M/uL


 


Hgb     (11.5-16.5)  g/dL


 


Hct     (37.0-47.0)  %


 


MCV     (76-96)  fL


 


MCH     (27.0-32.0)  pg


 


MCHC     (31.0-35.0)  g/dL


 


RDW     (11.0-16.0)  %


 


Plt Count     (150-500)  K/uL


 


MPV     (6.0-10.0)  fL


 


Neut % (Auto)     (45.0-70.0)  %


 


Lymph % (Auto)     (20.0-40.0)  %


 


Mono % (Auto)     (3.0-10.0)  %


 


Eos % (Auto)     (1.0-5.0)  %


 


Baso % (Auto)     (0.0-0.5)  %


 


Neut # (Auto)     (2.00-7.50)  K/uL


 


Lymph # (Auto)     (1.50-4.00)  K/uL


 


Mono # (Auto)     (0.20-0.80)  K/uL


 


Eos # (Auto)     (0.04-0.40)  K/uL


 


Baso # (Auto)     (0.02-0.10)  K/uL


 


D-Dimer, Quantitative     (0-400)  ng/mL


 


Sodium     (136-145)  mmol/L


 


Potassium     (3.5-5.1)  mmol/L


 


Chloride     ()  mmol/L


 


Carbon Dioxide     (21.0-32.0)  mmol/L


 


Anion Gap     (5.0-15.0)  mmol/L


 


BUN     (8-26)  mg/dL


 


Creatinine     (0.55-1.02)  mg/dL


 


Est Cr Clr Drug Dosing     mL/min


 


Estimated GFR (MDRD)     (>60)  MLS/MIN


 


BUN/Creatinine Ratio     (6-25)  


 


Glucose     ()  mg/dL


 


Lactic Acid   1.7  Cancelled  (0.4-2.0)  mmol/L


 


Calcium     (8.5-10.1)  mg/dL


 


Total Bilirubin     (0.0-1.0)  mg/dL


 


AST     (15-37)  U/L


 


ALT     (12-78)  U/L


 


Alkaline Phosphatase     ()  U/L


 


Troponin I     (0.000-0.060)  ng/mL


 


B-Natriuretic Peptide     (0-450)  pg/mL


 


Total Protein     (6.4-8.2)  g/dL


 


Albumin     (3.4-5.0)  g/dL


 


Globulin     (2.2-4.2)  g/dL


 


Albumin/Globulin Ratio     (0.8-2.0)  


 


Urine Color  Yellow    


 


Urine Appearance  Cloudy    (CLEAR)  


 


Urine pH  5.5    (5.0-8.0)  


 


Ur Specific Gravity  1.015    (1.003-1.030)  


 


Urine Protein  Negative    (NEGATIVE)  mg/dL


 


Urine Glucose (UA)  Negative    (NEGATIVE)  mg/dL


 


Urine Ketones  Negative    (NEGATIVE)  mg/dL


 


Urine Occult Blood  Trace-intact H    (NEGATIVE)  


 


Urine Nitrite  Negative    (NEGATIVE)  


 


Urine Bilirubin  Negative    (NEGATIVE)  


 


Urine Urobilinogen  0.2    (0.2-1.0)  E.U./dL


 


Ur Leukocyte Esterase  Small H    (NEGATIVE)  


 


Urine RBC  0-5 H    /HPF


 


Urine WBC  5-10 H    /HPF


 


Ur Squamous Epith Cells  Many    /HPF


 


Urine Bacteria  Moderate H    /HPF


 


SARS CoV-2 RNA Rapid DIAZ     














  03/08/21 03/08/21 03/08/21 Range/Units





  08:20 08:20 08:20 


 


WBC  11.7 H    (4.0-11.0)  K/uL


 


RBC  4.20    (3.80-5.80)  M/uL


 


Hgb  11.4 L    (11.5-16.5)  g/dL


 


Hct  37.8    (37.0-47.0)  %


 


MCV  90    (76-96)  fL


 


MCH  27.1    (27.0-32.0)  pg


 


MCHC  30.2 L    (31.0-35.0)  g/dL


 


RDW  15.2    (11.0-16.0)  %


 


Plt Count  291    (150-500)  K/uL


 


MPV  8.7    (6.0-10.0)  fL


 


Neut % (Auto)  90.2 H    (45.0-70.0)  %


 


Lymph % (Auto)  5.2 L    (20.0-40.0)  %


 


Mono % (Auto)  4.5    (3.0-10.0)  %


 


Eos % (Auto)  0.0 L    (1.0-5.0)  %


 


Baso % (Auto)  0.1    (0.0-0.5)  %


 


Neut # (Auto)  10.51 H    (2.00-7.50)  K/uL


 


Lymph # (Auto)  0.61 L    (1.50-4.00)  K/uL


 


Mono # (Auto)  0.52    (0.20-0.80)  K/uL


 


Eos # (Auto)  0.00 L    (0.04-0.40)  K/uL


 


Baso # (Auto)  0.01 L    (0.02-0.10)  K/uL


 


D-Dimer, Quantitative   1180 H   (0-400)  ng/mL


 


Sodium    138  (136-145)  mmol/L


 


Potassium    4.1  (3.5-5.1)  mmol/L


 


Chloride    100  ()  mmol/L


 


Carbon Dioxide    29.3  (21.0-32.0)  mmol/L


 


Anion Gap    12.8  (5.0-15.0)  mmol/L


 


BUN    23  (8-26)  mg/dL


 


Creatinine    1.19 H D  (0.55-1.02)  mg/dL


 


Est Cr Clr Drug Dosing    26.34  mL/min


 


Estimated GFR (MDRD)    43 L  (>60)  MLS/MIN


 


BUN/Creatinine Ratio    19.3  (6-25)  


 


Glucose    127 H  ()  mg/dL


 


Lactic Acid     (0.4-2.0)  mmol/L


 


Calcium    8.9  (8.5-10.1)  mg/dL


 


Total Bilirubin    0.3  D  (0.0-1.0)  mg/dL


 


AST    15  (15-37)  U/L


 


ALT    12  (12-78)  U/L


 


Alkaline Phosphatase    98  ()  U/L


 


Troponin I    < 0.017  D  (0.000-0.060)  ng/mL


 


B-Natriuretic Peptide    2620 H D  (0-450)  pg/mL


 


Total Protein    8.0  (6.4-8.2)  g/dL


 


Albumin    3.4  (3.4-5.0)  g/dL


 


Globulin    4.6 H  (2.2-4.2)  g/dL


 


Albumin/Globulin Ratio    0.7 L  (0.8-2.0)  


 


Urine Color     


 


Urine Appearance     (CLEAR)  


 


Urine pH     (5.0-8.0)  


 


Ur Specific Gravity     (1.003-1.030)  


 


Urine Protein     (NEGATIVE)  mg/dL


 


Urine Glucose (UA)     (NEGATIVE)  mg/dL


 


Urine Ketones     (NEGATIVE)  mg/dL


 


Urine Occult Blood     (NEGATIVE)  


 


Urine Nitrite     (NEGATIVE)  


 


Urine Bilirubin     (NEGATIVE)  


 


Urine Urobilinogen     (0.2-1.0)  E.U./dL


 


Ur Leukocyte Esterase     (NEGATIVE)  


 


Urine RBC     /HPF


 


Urine WBC     /HPF


 


Ur Squamous Epith Cells     /HPF


 


Urine Bacteria     /HPF


 


SARS CoV-2 RNA Rapid DIAZ     














  03/08/21 Range/Units





  08:20 


 


WBC   (4.0-11.0)  K/uL


 


RBC   (3.80-5.80)  M/uL


 


Hgb   (11.5-16.5)  g/dL


 


Hct   (37.0-47.0)  %


 


MCV   (76-96)  fL


 


MCH   (27.0-32.0)  pg


 


MCHC   (31.0-35.0)  g/dL


 


RDW   (11.0-16.0)  %


 


Plt Count   (150-500)  K/uL


 


MPV   (6.0-10.0)  fL


 


Neut % (Auto)   (45.0-70.0)  %


 


Lymph % (Auto)   (20.0-40.0)  %


 


Mono % (Auto)   (3.0-10.0)  %


 


Eos % (Auto)   (1.0-5.0)  %


 


Baso % (Auto)   (0.0-0.5)  %


 


Neut # (Auto)   (2.00-7.50)  K/uL


 


Lymph # (Auto)   (1.50-4.00)  K/uL


 


Mono # (Auto)   (0.20-0.80)  K/uL


 


Eos # (Auto)   (0.04-0.40)  K/uL


 


Baso # (Auto)   (0.02-0.10)  K/uL


 


D-Dimer, Quantitative   (0-400)  ng/mL


 


Sodium   (136-145)  mmol/L


 


Potassium   (3.5-5.1)  mmol/L


 


Chloride   ()  mmol/L


 


Carbon Dioxide   (21.0-32.0)  mmol/L


 


Anion Gap   (5.0-15.0)  mmol/L


 


BUN   (8-26)  mg/dL


 


Creatinine   (0.55-1.02)  mg/dL


 


Est Cr Clr Drug Dosing   mL/min


 


Estimated GFR (MDRD)   (>60)  MLS/MIN


 


BUN/Creatinine Ratio   (6-25)  


 


Glucose   ()  mg/dL


 


Lactic Acid  2.3 H  (0.4-2.0)  mmol/L


 


Calcium   (8.5-10.1)  mg/dL


 


Total Bilirubin   (0.0-1.0)  mg/dL


 


AST   (15-37)  U/L


 


ALT   (12-78)  U/L


 


Alkaline Phosphatase   ()  U/L


 


Troponin I   (0.000-0.060)  ng/mL


 


B-Natriuretic Peptide   (0-450)  pg/mL


 


Total Protein   (6.4-8.2)  g/dL


 


Albumin   (3.4-5.0)  g/dL


 


Globulin   (2.2-4.2)  g/dL


 


Albumin/Globulin Ratio   (0.8-2.0)  


 


Urine Color   


 


Urine Appearance   (CLEAR)  


 


Urine pH   (5.0-8.0)  


 


Ur Specific Gravity   (1.003-1.030)  


 


Urine Protein   (NEGATIVE)  mg/dL


 


Urine Glucose (UA)   (NEGATIVE)  mg/dL


 


Urine Ketones   (NEGATIVE)  mg/dL


 


Urine Occult Blood   (NEGATIVE)  


 


Urine Nitrite   (NEGATIVE)  


 


Urine Bilirubin   (NEGATIVE)  


 


Urine Urobilinogen   (0.2-1.0)  E.U./dL


 


Ur Leukocyte Esterase   (NEGATIVE)  


 


Urine RBC   /HPF


 


Urine WBC   /HPF


 


Ur Squamous Epith Cells   /HPF


 


Urine Bacteria   /HPF


 


SARS CoV-2 RNA Rapid DIAZ   











Med Orders - Current: 


                               Current Medications





Acetaminophen (Tylenol Arthritis Pain)  650 mg PO BID PRN


   PRN Reason: Pain


   Last Admin: 03/07/21 21:30 Dose:  650 mg


   Documented by: 


Albuterol/Ipratropium (Duoneb 3.0-0.5 Mg/3 Ml)  3 ml NEB Q4H PRN


   PRN Reason: Congestion


Aspirin (Halfprin)  81 mg PO DAILY [facility]


   Last Admin: 03/08/21 09:12 Dose:  81 mg


   Documented by: 


Atenolol (Tenormin)  12.5 mg PO DAILY [facility]


   Last Admin: 03/08/21 09:15 Dose:  12.5 mg


   Documented by: 


Atorvastatin Calcium (Lipitor)  10 mg PO BEDTIME [facility]


Calcium Carbonate/Glycine (Calcium Carbonate)  600 mg PO DAILY [facility]


   Last Admin: 03/08/21 09:12 Dose:  600 mg


   Documented by: 


Levofloxacin/Dextrose 750 mg/ (Levofloxacin/Dextrose)  300 mls @ 100 mls/hr IV 

Q24H [facility]


Iopamidol (Isovue-300 (61%))  100 ml IV .AS DIRECTED [facility]


Methylprednisolone Sodium Succinate (Solu-Medrol)  40 mg IVPUSH DAILY [facility]


   Last Admin: 03/08/21 09:16 Dose:  40 mg


   Documented by: 


Advair Diskus 100-50 (Inhaler**Own Med**)  1 each INH BID [facility]


   Last Admin: 03/08/21 09:13 Dose:  1 each


   Documented by: 


Pantoprazole Sodium (Protonix***)  40 mg PO ACBREAKFAST [facility]


   Last Admin: 03/08/21 10:10 Dose:  40 mg


   Documented by: 


Tiotropium Bromide (Spiriva Handihaler)  18 mcg INH DAILY [facility]


   Last Admin: 03/08/21 10:37 Dose:  Not Given


   Documented by: 





Discontinued Medications





Albuterol/Ipratropium (Duoneb 3.0-0.5 Mg/3 Ml)  3 ml NEB ONETIME ONE


   Stop: 03/07/21 15:03


   Last Admin: 03/07/21 15:08 Dose:  3 ml


   Documented by: 


Atenolol (Tenormin)  25 mg PO DAILY [facility]


Furosemide (Lasix)  20 mg IVPUSH DAILY [facility]


   Last Admin: 03/07/21 16:57 Dose:  20 mg


   Documented by: 


Furosemide (Lasix) Confirm Administered Dose 40 mg .ROUTE .STK-MED ONE


   Stop: 03/07/21 17:04


   Last Admin: 03/07/21 16:56 Dose:  Not Given


   Documented by: 


Levofloxacin/Dextrose 750 mg/ (Levofloxacin/Dextrose)  300 mls @ 100 mls/hr IV 

ONETIME ONE


   Stop: 03/07/21 19:33


   Last Admin: 03/07/21 16:43 Dose:  100 mls/hr


   Documented by: 


Levofloxacin/Dextrose (Levaquin In D5w 750 Mg/150 Ml) Confirm Administered Dose 

150 mls @ as directed IV .STK-MED ONE


   Stop: 03/07/21 16:40


   Last Admin: 03/07/21 16:44 Dose:  Not Given


   Documented by: 


Sodium Chloride (Normal Saline)  1,000 mls @ 75 mls/hr IV ASDIRECTED [facility]


   Stop: 03/08/21 09:01


   Last Admin: 03/07/21 19:08 Dose:  75 mls/hr


   Documented by: 


Methylprednisolone Sodium Succinate (Solu-Medrol)  40 mg IVPUSH ONETIME ONE


   Stop: 03/07/21 16:20


   Last Admin: 03/07/21 16:31 Dose:  40 mg


   Documented by: 


Methylprednisolone Sodium Succinate (Solu-Medrol) Confirm Administered Dose 40 

mg .ROUTE .STK-MED ONE


   Stop: 03/07/21 16:34


   Last Admin: 03/07/21 16:32 Dose:  Not Given


   Documented by: 


Mometasone Furoate/Formoterol Fumar (Dulera 100-5 Mcg)  2 puff IH BID CORNEL


   Last Admin: 03/07/21 22:40 Dose:  Not Given


   Documented by: 


Sodium Chloride (Normal Saline)  50 ml FLUSH ONETIME ONE


   Stop: 03/07/21 15:26

## 2021-03-08 NOTE — PCM.HP.2
H&P History of Present Illness





- General


Date of Service: 03/08/21


Source of Information: Patient, RN


History Limitations: Reports: No Limitations





- History of Present Illness


Initial Comments - Free Text/Narative: 








Mrs. Rodriguez is an 87YOWF who presents with SOB. She has a chronic history of 

COPD, Emphysema, ASHD. She is a resident of the [facility]. IT appears she has 

not been using her inhaled steroids as previously prescribed which may have 

attributed to her exacerbation.  She is on 3-4 L of O2 daily at home. She is 

seen frequently here for this compliant. No other concerns. SOB episodes are 

worsened by getting out of bed. She appears in no distress at rest. Crackles and

rales can be heard by auscultation. Heart rate is increased with variations of 

tachycardia, exacerbated by movement. Normal heart rhythm. No fever, No 

abdominal pain. No N, V or D. No edema. No HA.  


Onset of Symptoms: Reports: Gradual


Duration of Symptoms: Reports: Waxing/Waning


Location: Reports: Chest


Severity: Mild


Improves with: Reports: Rest, Other (O2)


Worsens with: Reports: Movement


Associated Symptoms: Reports: Shortness of Breath, Weakness





- Related Data


Allergies/Adverse Reactions: 


                                    Allergies











Allergy/AdvReac Type Severity Reaction Status Date / Time


 


amoxicillin Allergy  Cannot Verified 03/07/21 14:25





   Remember  


 


Penicillins Allergy  Cannot Verified 03/07/21 14:25





   Remember  











Home Medications: 


                                    Home Meds





Acetaminophen [Tylenol Arthritis] 1 tab PO BID 01/14/17 [History]


Aspirin [Melchor Chewable Aspirin] 81 mg PO DAILY 01/14/17 [History]


atorvaSTATin [Lipitor] 10 mg PO BEDTIME 01/03/21 [History]


Fluticasone/Salmeterol [Advair 100-50] 1 puff IH BID 30 Days #1 diskus 01/05/21 

[Rx]


Tiotropium [Spiriva HandiHaler] 18 mcg INH DAILY #30 cap 01/05/21 [Rx]


Albuterol/Ipratropium [DuoNeb 3.0-0.5 MG/3 ML] 1 vial INH Q4H 03/07/21 [History]


Calcium Carbonate [Calcium] 1 tab PO DAILY 03/07/21 [History]


Fluticasone Propionate [Flonase] 2 spray INH DAILY 03/07/21 [History]


Loratadine [Claritin] 1 tab PO DAILY 03/07/21 [History]


Meclizine [Antivert] 12.5 mg PO Q24H PRN 03/07/21 [History]


Pantoprazole Sodium [Protonix] 1 tab PO DAILY 03/07/21 [History]


atenoloL [Atenolol] 0.5 tab PO DAILY 03/07/21 [History]











Past Medical History


HEENT History: Reports: Allergic Rhinitis, Cataract, Impaired Vision, Macular 

Degeneration


Cardiovascular History: Reports: High Cholesterol, Hypertension


Other Cardiovascular History: 3cm aortic bulging.


Respiratory History: Reports: Asthma, Bronchitis, Recurrent, COPD, Pneumonia, 

Recurrent


Gastrointestinal History: Reports: GERD, Hemorrhoids, Hiatal Hernia


Genitourinary History: Reports: UTI, Recurrent


OB/GYN History: Reports: Pregnancy


Musculoskeletal History: Reports: Arthritis, Back Pain, Chronic, Fracture, 

Osteoarthritis





- Infectious Disease History


Infectious Disease History: Reports: C-Difficile, Chicken Pox, Measles, Mumps, 

Shingles





- Past Surgical History


HEENT Surgical History: Reports: Cataract Surgery, Radiocarotid Ectomy


Cardiovascular Surgical History: Reports: Carotid Endarterectomy


Respiratory Surgical History: Reports: None


GI Surgical History: Reports: Colonoscopy


Musculoskeletal Surgical History: Reports: Other (See Below)


Other Musculoskeletal Surgeries/Procedures:: PLATE IN RIGHT ANKLE





Social & Family History





- Family History


Family Medical History: No Pertinent Family History


OBGYN: Reports: None


Neurological: Reports: None


Psychiatric: Reports: None


Endocrine/Metabolic: Reports: None


Hematologic: Reports: None


Oncologic: Reports: Breast





- Tobacco Use


Tobacco Use Status *Q: Former Tobacco User


Used Tobacco, but Quit: Yes


Month/Year Tobacco Last Used: 1/2000





- Caffeine Use


Caffeine Use: Reports: Coffee





- Recreational Drug Use


Recreational Drug Use: No





H&P Review of Systems





- Review of Systems:


Review Of Systems: See Below


General: Reports: Weakness


Pulmonary: Reports: Shortness of Breath


Cardiovascular: Reports: Other (tachycardia)





Exam





- Exam


Exam: See Below





- Vital Signs


Vital Signs: 


                                Last Vital Signs











Temp  36.6 C   03/08/21 04:00


 


Pulse  109 H  03/08/21 09:15


 


Resp  24 H  03/08/21 04:00


 


BP  143/60 H  03/08/21 09:15


 


Pulse Ox  94 L  03/08/21 04:00











Weight: 77.111 kg





- Exam


General: Alert, Oriented, Cooperative, Mild Distress


HEENT: PERRLA, Conjunctiva Clear, Other (mild dehydration, dry mouth)


Neck: Supple, Trachea Midline


Lungs: Clear to Auscultation, Normal Respiratory Effort


Cardiovascular: Regular Rhythm, Tachycardia


GI/Abdominal Exam: Normal Bowel Sounds, Soft, Non-Tender


Back Exam: Normal Inspection


Extremities: Normal Inspection, Normal Range of Motion, Non-Tender


Peripheral Pulses: 2+: Brachial (L), Brachial (R)


Skin: Warm, Dry, Intact


Neurological: Cranial Nerves Intact


Neuro Extensive - Mental Status: Alert, Oriented x3, Normal Mood/Affect, Normal 

Cognition, Memory Intact


Neuro Extensive - Motor, Sensory, Reflexes: CN II-XII Intact, Normal Reflexes


Psychiatric: Alert, Normal Affect, Normal Mood





- Patient Data


Lab Results Last 24 hrs: 


                         Laboratory Results - last 24 hr











  03/07/21 03/07/21 03/07/21 Range/Units





  14:38 14:38 14:38 


 


WBC  12.1 H    (4.0-11.0)  K/uL


 


RBC  4.46    (3.80-5.80)  M/uL


 


Hgb  12.2    (11.5-16.5)  g/dL


 


Hct  40.6    (37.0-47.0)  %


 


MCV  91    (76-96)  fL


 


MCH  27.4    (27.0-32.0)  pg


 


MCHC  30.0 L    (31.0-35.0)  g/dL


 


RDW  15.2    (11.0-16.0)  %


 


Plt Count  277    (150-500)  K/uL


 


MPV  8.9    (6.0-10.0)  fL


 


Neut % (Auto)  78.0 H    (45.0-70.0)  %


 


Lymph % (Auto)  10.1 L    (20.0-40.0)  %


 


Mono % (Auto)  8.3    (3.0-10.0)  %


 


Eos % (Auto)  3.4    (1.0-5.0)  %


 


Baso % (Auto)  0.2    (0.0-0.5)  %


 


Neut # (Auto)  9.46 H    (2.00-7.50)  K/uL


 


Lymph # (Auto)  1.22 L    (1.50-4.00)  K/uL


 


Mono # (Auto)  1.00 H    (0.20-0.80)  K/uL


 


Eos # (Auto)  0.41 H    (0.04-0.40)  K/uL


 


Baso # (Auto)  0.02    (0.02-0.10)  K/uL


 


D-Dimer, Quantitative     (0-400)  ng/mL


 


Sodium   136   (136-145)  mmol/L


 


Potassium   4.9   (3.5-5.1)  mmol/L


 


Chloride   100   ()  mmol/L


 


Carbon Dioxide   29.2   (21.0-32.0)  mmol/L


 


Anion Gap   11.7   (5.0-15.0)  mmol/L


 


BUN   27 H   (8-26)  mg/dL


 


Creatinine   0.97  D   (0.55-1.02)  mg/dL


 


Est Cr Clr Drug Dosing   32.32   mL/min


 


Estimated GFR (MDRD)   54 L   (>60)  MLS/MIN


 


BUN/Creatinine Ratio   27.8 H   (6-25)  


 


Glucose   120 H   ()  mg/dL


 


Lactic Acid     (0.4-2.0)  mmol/L


 


Calcium   9.1   (8.5-10.1)  mg/dL


 


Total Bilirubin   0.5  D   (0.0-1.0)  mg/dL


 


AST   51 H   (15-37)  U/L


 


ALT   16   (12-78)  U/L


 


Alkaline Phosphatase   111   ()  U/L


 


Troponin I     (0.000-0.060)  ng/mL


 


B-Natriuretic Peptide     (0-450)  pg/mL


 


Total Protein   8.3 H   (6.4-8.2)  g/dL


 


Albumin   3.5   (3.4-5.0)  g/dL


 


Globulin   4.8 H   (2.2-4.2)  g/dL


 


Albumin/Globulin Ratio   0.7 L   (0.8-2.0)  


 


Urine Color     


 


Urine Appearance     (CLEAR)  


 


Urine pH     (5.0-8.0)  


 


Ur Specific Gravity     (1.003-1.030)  


 


Urine Protein     (NEGATIVE)  mg/dL


 


Urine Glucose (UA)     (NEGATIVE)  mg/dL


 


Urine Ketones     (NEGATIVE)  mg/dL


 


Urine Occult Blood     (NEGATIVE)  


 


Urine Nitrite     (NEGATIVE)  


 


Urine Bilirubin     (NEGATIVE)  


 


Urine Urobilinogen     (0.2-1.0)  E.U./dL


 


Ur Leukocyte Esterase     (NEGATIVE)  


 


Urine RBC     /HPF


 


Urine WBC     /HPF


 


Ur Squamous Epith Cells     /HPF


 


Urine Bacteria     /HPF


 


SARS CoV-2 RNA Rapid DIAZ    Negative  














  03/07/21 03/07/21 03/08/21 Range/Units





  16:11 18:58 08:11 


 


WBC     (4.0-11.0)  K/uL


 


RBC     (3.80-5.80)  M/uL


 


Hgb     (11.5-16.5)  g/dL


 


Hct     (37.0-47.0)  %


 


MCV     (76-96)  fL


 


MCH     (27.0-32.0)  pg


 


MCHC     (31.0-35.0)  g/dL


 


RDW     (11.0-16.0)  %


 


Plt Count     (150-500)  K/uL


 


MPV     (6.0-10.0)  fL


 


Neut % (Auto)     (45.0-70.0)  %


 


Lymph % (Auto)     (20.0-40.0)  %


 


Mono % (Auto)     (3.0-10.0)  %


 


Eos % (Auto)     (1.0-5.0)  %


 


Baso % (Auto)     (0.0-0.5)  %


 


Neut # (Auto)     (2.00-7.50)  K/uL


 


Lymph # (Auto)     (1.50-4.00)  K/uL


 


Mono # (Auto)     (0.20-0.80)  K/uL


 


Eos # (Auto)     (0.04-0.40)  K/uL


 


Baso # (Auto)     (0.02-0.10)  K/uL


 


D-Dimer, Quantitative     (0-400)  ng/mL


 


Sodium     (136-145)  mmol/L


 


Potassium     (3.5-5.1)  mmol/L


 


Chloride     ()  mmol/L


 


Carbon Dioxide     (21.0-32.0)  mmol/L


 


Anion Gap     (5.0-15.0)  mmol/L


 


BUN     (8-26)  mg/dL


 


Creatinine     (0.55-1.02)  mg/dL


 


Est Cr Clr Drug Dosing     mL/min


 


Estimated GFR (MDRD)     (>60)  MLS/MIN


 


BUN/Creatinine Ratio     (6-25)  


 


Glucose     ()  mg/dL


 


Lactic Acid   1.7  Cancelled  (0.4-2.0)  mmol/L


 


Calcium     (8.5-10.1)  mg/dL


 


Total Bilirubin     (0.0-1.0)  mg/dL


 


AST     (15-37)  U/L


 


ALT     (12-78)  U/L


 


Alkaline Phosphatase     ()  U/L


 


Troponin I     (0.000-0.060)  ng/mL


 


B-Natriuretic Peptide     (0-450)  pg/mL


 


Total Protein     (6.4-8.2)  g/dL


 


Albumin     (3.4-5.0)  g/dL


 


Globulin     (2.2-4.2)  g/dL


 


Albumin/Globulin Ratio     (0.8-2.0)  


 


Urine Color  Yellow    


 


Urine Appearance  Cloudy    (CLEAR)  


 


Urine pH  5.5    (5.0-8.0)  


 


Ur Specific Gravity  1.015    (1.003-1.030)  


 


Urine Protein  Negative    (NEGATIVE)  mg/dL


 


Urine Glucose (UA)  Negative    (NEGATIVE)  mg/dL


 


Urine Ketones  Negative    (NEGATIVE)  mg/dL


 


Urine Occult Blood  Trace-intact H    (NEGATIVE)  


 


Urine Nitrite  Negative    (NEGATIVE)  


 


Urine Bilirubin  Negative    (NEGATIVE)  


 


Urine Urobilinogen  0.2    (0.2-1.0)  E.U./dL


 


Ur Leukocyte Esterase  Small H    (NEGATIVE)  


 


Urine RBC  0-5 H    /HPF


 


Urine WBC  5-10 H    /HPF


 


Ur Squamous Epith Cells  Many    /HPF


 


Urine Bacteria  Moderate H    /HPF


 


SARS CoV-2 RNA Rapid DIAZ     














  03/08/21 03/08/21 03/08/21 Range/Units





  08:20 08:20 08:20 


 


WBC  11.7 H    (4.0-11.0)  K/uL


 


RBC  4.20    (3.80-5.80)  M/uL


 


Hgb  11.4 L    (11.5-16.5)  g/dL


 


Hct  37.8    (37.0-47.0)  %


 


MCV  90    (76-96)  fL


 


MCH  27.1    (27.0-32.0)  pg


 


MCHC  30.2 L    (31.0-35.0)  g/dL


 


RDW  15.2    (11.0-16.0)  %


 


Plt Count  291    (150-500)  K/uL


 


MPV  8.7    (6.0-10.0)  fL


 


Neut % (Auto)  90.2 H    (45.0-70.0)  %


 


Lymph % (Auto)  5.2 L    (20.0-40.0)  %


 


Mono % (Auto)  4.5    (3.0-10.0)  %


 


Eos % (Auto)  0.0 L    (1.0-5.0)  %


 


Baso % (Auto)  0.1    (0.0-0.5)  %


 


Neut # (Auto)  10.51 H    (2.00-7.50)  K/uL


 


Lymph # (Auto)  0.61 L    (1.50-4.00)  K/uL


 


Mono # (Auto)  0.52    (0.20-0.80)  K/uL


 


Eos # (Auto)  0.00 L    (0.04-0.40)  K/uL


 


Baso # (Auto)  0.01 L    (0.02-0.10)  K/uL


 


D-Dimer, Quantitative   1180 H   (0-400)  ng/mL


 


Sodium    138  (136-145)  mmol/L


 


Potassium    4.1  (3.5-5.1)  mmol/L


 


Chloride    100  ()  mmol/L


 


Carbon Dioxide    29.3  (21.0-32.0)  mmol/L


 


Anion Gap    12.8  (5.0-15.0)  mmol/L


 


BUN    23  (8-26)  mg/dL


 


Creatinine    1.19 H D  (0.55-1.02)  mg/dL


 


Est Cr Clr Drug Dosing    26.34  mL/min


 


Estimated GFR (MDRD)    43 L  (>60)  MLS/MIN


 


BUN/Creatinine Ratio    19.3  (6-25)  


 


Glucose    127 H  ()  mg/dL


 


Lactic Acid     (0.4-2.0)  mmol/L


 


Calcium    8.9  (8.5-10.1)  mg/dL


 


Total Bilirubin    0.3  D  (0.0-1.0)  mg/dL


 


AST    15  (15-37)  U/L


 


ALT    12  (12-78)  U/L


 


Alkaline Phosphatase    98  ()  U/L


 


Troponin I    < 0.017  D  (0.000-0.060)  ng/mL


 


B-Natriuretic Peptide    2620 H D  (0-450)  pg/mL


 


Total Protein    8.0  (6.4-8.2)  g/dL


 


Albumin    3.4  (3.4-5.0)  g/dL


 


Globulin    4.6 H  (2.2-4.2)  g/dL


 


Albumin/Globulin Ratio    0.7 L  (0.8-2.0)  


 


Urine Color     


 


Urine Appearance     (CLEAR)  


 


Urine pH     (5.0-8.0)  


 


Ur Specific Gravity     (1.003-1.030)  


 


Urine Protein     (NEGATIVE)  mg/dL


 


Urine Glucose (UA)     (NEGATIVE)  mg/dL


 


Urine Ketones     (NEGATIVE)  mg/dL


 


Urine Occult Blood     (NEGATIVE)  


 


Urine Nitrite     (NEGATIVE)  


 


Urine Bilirubin     (NEGATIVE)  


 


Urine Urobilinogen     (0.2-1.0)  E.U./dL


 


Ur Leukocyte Esterase     (NEGATIVE)  


 


Urine RBC     /HPF


 


Urine WBC     /HPF


 


Ur Squamous Epith Cells     /HPF


 


Urine Bacteria     /HPF


 


SARS CoV-2 RNA Rapid DIAZ     














  03/08/21 Range/Units





  08:20 


 


WBC   (4.0-11.0)  K/uL


 


RBC   (3.80-5.80)  M/uL


 


Hgb   (11.5-16.5)  g/dL


 


Hct   (37.0-47.0)  %


 


MCV   (76-96)  fL


 


MCH   (27.0-32.0)  pg


 


MCHC   (31.0-35.0)  g/dL


 


RDW   (11.0-16.0)  %


 


Plt Count   (150-500)  K/uL


 


MPV   (6.0-10.0)  fL


 


Neut % (Auto)   (45.0-70.0)  %


 


Lymph % (Auto)   (20.0-40.0)  %


 


Mono % (Auto)   (3.0-10.0)  %


 


Eos % (Auto)   (1.0-5.0)  %


 


Baso % (Auto)   (0.0-0.5)  %


 


Neut # (Auto)   (2.00-7.50)  K/uL


 


Lymph # (Auto)   (1.50-4.00)  K/uL


 


Mono # (Auto)   (0.20-0.80)  K/uL


 


Eos # (Auto)   (0.04-0.40)  K/uL


 


Baso # (Auto)   (0.02-0.10)  K/uL


 


D-Dimer, Quantitative   (0-400)  ng/mL


 


Sodium   (136-145)  mmol/L


 


Potassium   (3.5-5.1)  mmol/L


 


Chloride   ()  mmol/L


 


Carbon Dioxide   (21.0-32.0)  mmol/L


 


Anion Gap   (5.0-15.0)  mmol/L


 


BUN   (8-26)  mg/dL


 


Creatinine   (0.55-1.02)  mg/dL


 


Est Cr Clr Drug Dosing   mL/min


 


Estimated GFR (MDRD)   (>60)  MLS/MIN


 


BUN/Creatinine Ratio   (6-25)  


 


Glucose   ()  mg/dL


 


Lactic Acid  2.3 H  (0.4-2.0)  mmol/L


 


Calcium   (8.5-10.1)  mg/dL


 


Total Bilirubin   (0.0-1.0)  mg/dL


 


AST   (15-37)  U/L


 


ALT   (12-78)  U/L


 


Alkaline Phosphatase   ()  U/L


 


Troponin I   (0.000-0.060)  ng/mL


 


B-Natriuretic Peptide   (0-450)  pg/mL


 


Total Protein   (6.4-8.2)  g/dL


 


Albumin   (3.4-5.0)  g/dL


 


Globulin   (2.2-4.2)  g/dL


 


Albumin/Globulin Ratio   (0.8-2.0)  


 


Urine Color   


 


Urine Appearance   (CLEAR)  


 


Urine pH   (5.0-8.0)  


 


Ur Specific Gravity   (1.003-1.030)  


 


Urine Protein   (NEGATIVE)  mg/dL


 


Urine Glucose (UA)   (NEGATIVE)  mg/dL


 


Urine Ketones   (NEGATIVE)  mg/dL


 


Urine Occult Blood   (NEGATIVE)  


 


Urine Nitrite   (NEGATIVE)  


 


Urine Bilirubin   (NEGATIVE)  


 


Urine Urobilinogen   (0.2-1.0)  E.U./dL


 


Ur Leukocyte Esterase   (NEGATIVE)  


 


Urine RBC   /HPF


 


Urine WBC   /HPF


 


Ur Squamous Epith Cells   /HPF


 


Urine Bacteria   /HPF


 


SARS CoV-2 RNA Rapid DIAZ   











Result Diagrams: 


                                 03/08/21 08:20





                                 03/08/21 08:20





Sepsis Event Note





- Evaluation


Sepsis Screening Result: Severe Sepsis Risk





- Focused Exam


Vital Signs: 


                                   Vital Signs











  Temp Pulse Pulse Resp BP BP Pulse Ox


 


 03/08/21 09:15   109 H    143/60 H  


 


 03/08/21 04:00  36.6 C   108 H  24 H   106/53 L  94 L


 


 03/08/21 00:00        95


 


 03/07/21 23:00    105 H  18    94 L














- Problem List


(1) Emphysema lung


SNOMED Code(s): 92425319


   ICD Code: J43.9 - EMPHYSEMA, UNSPECIFIED   Status: Chronic   Priority: High  

 Current Visit: Yes   


Qualifiers: 


   Emphysema type: centrilobular   Qualified Code(s): J43.2 - Centrilobular 

emphysema   





(2) UTI (urinary tract infection)


SNOMED Code(s): 14277480


   ICD Code: N39.0 - URINARY TRACT INFECTION, SITE NOT SPECIFIED   Status: Acute

   Priority: Medium   Current Visit: Yes   


Qualifiers: 


   Urinary tract infection type: acute cystitis 


Problem List Initiated/Reviewed/Updated: Yes


Orders Last 24hrs: 


                               Active Orders 24 hr











 Category Date Time Status


 


 EKG Documentation Completion [RC] ASDIRECTED Care  03/07/21 15:25 Active


 


 Overnight Pulse Oximetry [RC] 00 Care  03/07/21 16:51 Active


 


 RT Aerosol Therapy [RC] .PRN Care  03/07/21 15:04 Active


 


 RT Post Treatment Assessment [RC] Click to Edit Care  03/07/21 19:43 Active


 


 Telemetry Monitoring [Cardiac Monitoring] [RC] 08,20 Care  03/07/21 16:50 

Active


 


 CBC WITH AUTO DIFF [HEME] AM Lab  03/09/21 05:11 Ordered


 


 COMPREHENSIVE METABOLIC PN,CMP [CHEM] DAILY Lab  03/08/21 16:57 Ordered


 


 COMPREHENSIVE METABOLIC PN,CMP [CHEM] DAILY Lab  03/09/21 16:57 Ordered


 


 COMPREHENSIVE METABOLIC PN,CMP [CHEM] DAILY Lab  03/10/21 16:57 Ordered


 


 CULTURE URINE [RM] Stat Lab  03/07/21 16:11 Received


 


 Acetaminophen [Tylenol Arthritis Pain] Med  03/07/21 22:13 Active





 650 mg PO BID PRN   


 


 Albuterol/Ipratropium [DuoNeb 3.0-0.5 MG/3 ML] Med  03/07/21 19:42 Active





 3 ml NEB Q4H PRN   


 


 Aspirin [Halfprin] Med  03/08/21 08:00 Active





 81 mg PO DAILY   


 


 Calcium Carbonate Med  03/08/21 08:00 Active





 600 mg PO DAILY   


 


 Iopamidol [Isovue-300 (61%)] Med  03/07/21 15:30 Active





 100 ml IV .AS DIRECTED   


 


 Levofloxacin/Dextrose 5%-Water [Levaquin in D5W 750 MG/ Med  03/08/21 12:00 

Pending





 150 ML] 750 mg   





 Levofloxacin/Dextrose 5%-Water [Levaquin in D5W 750 MG/   





 150 ML] 150 ml   





 IV Q24H   


 


 Non-Formulary Medication [NF Drug] Med  03/07/21 22:15 Active





 1 each INH BID   


 


 Pantoprazole [ProTONIX***] Med  03/08/21 07:00 Active





 40 mg PO ACBREAKFAST   


 


 Tiotropium [Spiriva HandiHaler] Med  03/08/21 08:00 Active





 18 mcg INH DAILY   


 


 atenoloL [Tenormin] Med  03/08/21 08:00 Active





 12.5 mg PO DAILY   


 


 atorvaSTATin [Lipitor] Med  03/08/21 20:00 Active





 10 mg PO BEDTIME   


 


 methylPREDNISolone Sod Succ [Solu-MEDROL] Med  03/08/21 08:00 Active





 40 mg IVPUSH DAILY   


 


 Pulse Oximetry Continuous Monitoring [OM.PC] Routine Oth  03/07/21 16:50 

Ordered


 


 Resuscitation Status Routine Resus Stat  03/07/21 18:39 Ordered








                                Medication Orders





Acetaminophen (Tylenol Arthritis Pain)  650 mg PO BID PRN


   PRN Reason: Pain


   Last Admin: 03/07/21 21:30  Dose: 650 mg


   Documented by: GEM


Albuterol/Ipratropium (Duoneb 3.0-0.5 Mg/3 Ml)  3 ml NEB Q4H PRN


   PRN Reason: Congestion


Aspirin (Halfprin)  81 mg PO DAILY [facility]


   Last Admin: 03/08/21 09:12  Dose: 81 mg


   Documented by: TAWANNA


Atenolol (Tenormin)  12.5 mg PO DAILY [facility]


   Last Admin: 03/08/21 09:15  Dose: 12.5 mg


   Documented by: TAWANNA


Atorvastatin Calcium (Lipitor)  10 mg PO BEDTIME [facility]


Calcium Carbonate/Glycine (Calcium Carbonate)  600 mg PO DAILY [facility]


   Last Admin: 03/08/21 09:12  Dose: 600 mg


   Documented by: TAWANNA


Levofloxacin/Dextrose 750 mg/ (Levofloxacin/Dextrose)  300 mls @ 100 mls/hr IV 

Q24H [facility]


Iopamidol (Isovue-300 (61%))  100 ml IV .AS DIRECTED [facility]


Methylprednisolone Sodium Succinate (Solu-Medrol)  40 mg IVPUSH DAILY [facility]


   Last Admin: 03/08/21 09:16  Dose: 40 mg


   Documented by: TAWANNA


Advair Diskus 100-50 (Inhaler**Own Med**)  1 each INH BID [facility]


   Last Admin: 03/08/21 09:13  Dose: 1 each


   Documented by: TAWANNA


   Admin: 03/07/21 22:07  Dose: 1 each


   Documented by: GEM


Pantoprazole Sodium (Protonix***)  40 mg PO ACBREAKFAST [facility]


   Last Admin: 03/08/21 10:10  Dose: 40 mg


   Documented by: TAWANNA


Tiotropium Bromide (Spiriva Handihaler)  18 mcg INH DAILY [facility]








Assessment/Plan Comment:: 


Assessment-


UTI, chronic emphysema. 





Plan-


UTI- DC on Levaquin 750 q 48hrs for 3 days. Increase oral fluids. 











COPD-Continue back on Advair with Spiriva. 


NC 3-4L of O2 at home.


Medrol dose pack for OP use. 








- Mortality Measure


Prognosis:: Good

## 2021-03-09 NOTE — PCM.PN
- General Info


Date of Service: 03/09/21


Admission Dx/Problem (Free Text): 





UTI 


COPD exacerbation


Functional Status: Reports: Pain Controlled





- Review of Systems


General: Reports: No Symptoms


HEENT: Reports: No Symptoms


Pulmonary: Reports: Other (improved lung sounds since admission, some wheezing 

noted.)


Cardiovascular: Reports: No Symptoms


Gastrointestinal: Reports: No Symptoms


Genitourinary: Reports: No Symptoms


Musculoskeletal: Reports: No Symptoms


Skin: Reports: No Symptoms


Neurological: Reports: No Symptoms


Psychiatric: Reports: No Symptoms





- Patient Data


Vitals - Most Recent: 


                                Last Vital Signs











Temp  36.6 C   03/09/21 08:00


 


Pulse  89   03/09/21 08:54


 


Resp  18   03/09/21 08:00


 


BP  153/76 H  03/09/21 08:54


 


Pulse Ox  94 L  03/09/21 08:00











Weight - Most Recent: 75.977 kg


I&O - Last 24 Hours: 


                                 Intake & Output











 03/08/21 03/09/21 03/09/21





 22:59 06:59 14:59


 


Intake Total 950  


 


Balance 950  











Lab Results Last 24 Hours: 


                         Laboratory Results - last 24 hr











  03/08/21 03/09/21 03/09/21 Range/Units





  16:00 08:10 08:15 


 


WBC   15.1 H D   (4.0-11.0)  K/uL


 


RBC   4.21   (3.80-5.80)  M/uL


 


Hgb   11.5   (11.5-16.5)  g/dL


 


Hct   38.3   (37.0-47.0)  %


 


MCV   91   (76-96)  fL


 


MCH   27.3   (27.0-32.0)  pg


 


MCHC   30.0 L   (31.0-35.0)  g/dL


 


RDW   15.5   (11.0-16.0)  %


 


Plt Count   323   (150-500)  K/uL


 


MPV   8.7   (6.0-10.0)  fL


 


Neut % (Auto)   83.7 H   (45.0-70.0)  %


 


Lymph % (Auto)   8.0 L   (20.0-40.0)  %


 


Mono % (Auto)   7.9   (3.0-10.0)  %


 


Eos % (Auto)   0.3 L   (1.0-5.0)  %


 


Baso % (Auto)   0.1   (0.0-0.5)  %


 


Neut # (Auto)   12.66 H   (2.00-7.50)  K/uL


 


Lymph # (Auto)   1.21 L   (1.50-4.00)  K/uL


 


Mono # (Auto)   1.19 H   (0.20-0.80)  K/uL


 


Eos # (Auto)   0.04   (0.04-0.40)  K/uL


 


Baso # (Auto)   0.01 L   (0.02-0.10)  K/uL


 


D-Dimer, Quantitative    910 H  (0-400)  ng/mL


 


Sodium     (136-145)  mmol/L


 


Potassium     (3.5-5.1)  mmol/L


 


Chloride     ()  mmol/L


 


Carbon Dioxide     (21.0-32.0)  mmol/L


 


Anion Gap     (5.0-15.0)  mmol/L


 


BUN     (8-26)  mg/dL


 


Creatinine     (0.55-1.02)  mg/dL


 


Est Cr Clr Drug Dosing     mL/min


 


Estimated GFR (MDRD)     (>60)  MLS/MIN


 


BUN/Creatinine Ratio     (6-25)  


 


Glucose     ()  mg/dL


 


Lactic Acid  2.0    (0.4-2.0)  mmol/L


 


Calcium     (8.5-10.1)  mg/dL


 


Total Bilirubin     (0.0-1.0)  mg/dL


 


AST     (15-37)  U/L


 


ALT     (12-78)  U/L


 


Alkaline Phosphatase     ()  U/L


 


C-Reactive Protein     (0.0-3.0)  mg/L


 


Total Protein     (6.4-8.2)  g/dL


 


Albumin     (3.4-5.0)  g/dL


 


Globulin     (2.2-4.2)  g/dL


 


Albumin/Globulin Ratio     (0.8-2.0)  














  03/09/21 03/09/21 03/09/21 Range/Units





  10:20 10:20 10:20 


 


WBC     (4.0-11.0)  K/uL


 


RBC     (3.80-5.80)  M/uL


 


Hgb     (11.5-16.5)  g/dL


 


Hct     (37.0-47.0)  %


 


MCV     (76-96)  fL


 


MCH     (27.0-32.0)  pg


 


MCHC     (31.0-35.0)  g/dL


 


RDW     (11.0-16.0)  %


 


Plt Count     (150-500)  K/uL


 


MPV     (6.0-10.0)  fL


 


Neut % (Auto)     (45.0-70.0)  %


 


Lymph % (Auto)     (20.0-40.0)  %


 


Mono % (Auto)     (3.0-10.0)  %


 


Eos % (Auto)     (1.0-5.0)  %


 


Baso % (Auto)     (0.0-0.5)  %


 


Neut # (Auto)     (2.00-7.50)  K/uL


 


Lymph # (Auto)     (1.50-4.00)  K/uL


 


Mono # (Auto)     (0.20-0.80)  K/uL


 


Eos # (Auto)     (0.04-0.40)  K/uL


 


Baso # (Auto)     (0.02-0.10)  K/uL


 


D-Dimer, Quantitative     (0-400)  ng/mL


 


Sodium    140  (136-145)  mmol/L


 


Potassium    3.9  (3.5-5.1)  mmol/L


 


Chloride    103  ()  mmol/L


 


Carbon Dioxide    28.2  (21.0-32.0)  mmol/L


 


Anion Gap    12.7  (5.0-15.0)  mmol/L


 


BUN    27 H  (8-26)  mg/dL


 


Creatinine    1.18 H  (0.55-1.02)  mg/dL


 


Est Cr Clr Drug Dosing    26.57  mL/min


 


Estimated GFR (MDRD)    43 L  (>60)  MLS/MIN


 


BUN/Creatinine Ratio    22.9  (6-25)  


 


Glucose    104 H  ()  mg/dL


 


Lactic Acid  2.8 H    (0.4-2.0)  mmol/L


 


Calcium    9.0  (8.5-10.1)  mg/dL


 


Total Bilirubin    0.2  D  (0.0-1.0)  mg/dL


 


AST    16  (15-37)  U/L


 


ALT    12  (12-78)  U/L


 


Alkaline Phosphatase    96  ()  U/L


 


C-Reactive Protein   55.7 H   (0.0-3.0)  mg/L


 


Total Protein    7.7  (6.4-8.2)  g/dL


 


Albumin    3.2 L  (3.4-5.0)  g/dL


 


Globulin    4.5 H  (2.2-4.2)  g/dL


 


Albumin/Globulin Ratio    0.7 L  (0.8-2.0)  











Med Orders - Current: 


                               Current Medications





Acetaminophen (Acetaminophen 650 Mg Tab.Er)  650 mg PO BID PRN


   PRN Reason: Pain


   Last Admin: 03/09/21 08:59 Dose:  650 mg


   Documented by: 


Albuterol/Ipratropium (Duoneb 3.0-0.5 Mg/3 Ml)  3 ml NEB Q4H PRN


   PRN Reason: Congestion


Aspirin (Aspirin 81 Mg Tab.Ec)  81 mg PO DAILY [facility]


   Last Admin: 03/09/21 08:49 Dose:  81 mg


   Documented by: 


Atenolol (Atenolol 25 Mg Tab)  12.5 mg PO DAILY [facility]


   Last Admin: 03/09/21 08:54 Dose:  12.5 mg


   Documented by: 


Atorvastatin Calcium (Lipitor)  10 mg PO BEDTIME [facility]


   Last Admin: 03/08/21 19:31 Dose:  10 mg


   Documented by: 


Calcium Carbonate/Glycine (Calcium Carbonate 600 Mg Tab)  600 mg PO DAILY [facility]


   Last Admin: 03/09/21 08:49 Dose:  600 mg


   Documented by: 


Iopamidol (Isovue-300 (61%))  100 ml IV .AS DIRECTED [facility]


Advair Diskus 100-50 (Inhaler**Own Med**)  1 each INH BID [facility]


   Last Admin: 03/09/21 09:00 Dose:  1 each


   Documented by: 


Pantoprazole Sodium (Pantoprazole 40 Mg Tab.Cr)  40 mg PO ACBREAKFAST [facility]


   Last Admin: 03/09/21 08:48 Dose:  40 mg


   Documented by: 


Tiotropium Bromide (Spiriva Handihaler)  18 mcg INH DAILY [facility]


   Last Admin: 03/09/21 08:55 Dose:  18 mcg


   Documented by: 





Discontinued Medications





Albuterol/Ipratropium (Duoneb 3.0-0.5 Mg/3 Ml)  3 ml NEB ONETIME ONE


   Stop: 03/07/21 15:03


   Last Admin: 03/07/21 15:08 Dose:  3 ml


   Documented by: 


Atenolol (Tenormin)  25 mg PO DAILY [facility]


Furosemide (Lasix)  20 mg IVPUSH DAILY [facility]


   Last Admin: 03/07/21 16:57 Dose:  20 mg


   Documented by: 


Furosemide (Lasix) Confirm Administered Dose 40 mg .ROUTE .STK-MED ONE


   Stop: 03/07/21 17:04


   Last Admin: 03/07/21 16:56 Dose:  Not Given


   Documented by: 


Furosemide (Furosemide 20 Mg Tab)  20 mg PO ONETIME ONE


   Stop: 03/09/21 08:01


   Last Admin: 03/09/21 08:49 Dose:  20 mg


   Documented by: 


Levofloxacin/Dextrose 750 mg/ (Levofloxacin/Dextrose)  300 mls @ 100 mls/hr IV 

ONETIME ONE


   Stop: 03/07/21 19:33


   Last Admin: 03/07/21 16:43 Dose:  100 mls/hr


   Documented by: 


Levofloxacin/Dextrose (Levaquin In D5w 750 Mg/150 Ml) Confirm Administered Dose 

150 mls @ as directed IV .STK-MED ONE


   Stop: 03/07/21 16:40


   Last Admin: 03/07/21 16:44 Dose:  Not Given


   Documented by: 


Sodium Chloride (Normal Saline)  1,000 mls @ 75 mls/hr IV ASDIRECTED [facility]


   Stop: 03/08/21 09:01


   Last Admin: 03/07/21 19:08 Dose:  75 mls/hr


   Documented by: 


Methylprednisolone (Methylprednisolone 4 Mg Tab 21 Tab/Dosepak)  8 mg PO ONETIME

ONE; Protocol


   Stop: 03/09/21 09:01


   Last Admin: 03/09/21 09:00 Dose:  8 mg


   Documented by: 


Methylprednisolone Sodium Succinate (Solu-Medrol)  40 mg IVPUSH ONETIME ONE


   Stop: 03/07/21 16:20


   Last Admin: 03/07/21 16:31 Dose:  40 mg


   Documented by: 


Methylprednisolone Sodium Succinate (Solu-Medrol) Confirm Administered Dose 40 

mg .ROUTE .STK-MED ONE


   Stop: 03/07/21 16:34


   Last Admin: 03/07/21 16:32 Dose:  Not Given


   Documented by: 


Methylprednisolone Sodium Succinate (Methylprednisolone Sodium Succinate 40 Mg/1

Ml Sdv)  40 mg IVPUSH DAILY [facility]


   Last Admin: 03/09/21 09:00 Dose:  Not Given


   Documented by: 


Mometasone Furoate/Formoterol Fumar (Dulera 100-5 Mcg)  2 puff IH BID [facility]


   Last Admin: 03/07/21 22:40 Dose:  Not Given


   Documented by: 


Sodium Chloride (Normal Saline)  50 ml FLUSH ONETIME ONE


   Stop: 03/07/21 15:26


   Last Admin: 03/09/21 07:40 Dose:  Not Given


   Documented by: 








Comments:: 





Patient has improved greatly upon admission, lung sounds have improved with 

minimal wheezing. She will be ready for DC today.  





- Exam


General: Alert, Oriented, Cooperative, No Acute Distress


HEENT: Pupils Equal, Pupils Reactive


Neck: Supple, Trachea Midline


Lungs: Clear to Auscultation, Wheezing


Cardiovascular: Regular Rate, Regular Rhythm


GI/Abdominal Exam: Normal Bowel Sounds, Soft, Non-Tender


Extremities: Normal Inspection, Normal Range of Motion


Skin: Warm, Dry, Intact


Neurological: No New Focal Deficit


Psy/Mental Status: Alert, Normal Affect, Normal Mood





- Patient Data


Lab Results Last 24 hrs: 


                         Laboratory Results - last 24 hr











  03/08/21 03/09/21 03/09/21 Range/Units





  16:00 08:10 08:15 


 


WBC   15.1 H D   (4.0-11.0)  K/uL


 


RBC   4.21   (3.80-5.80)  M/uL


 


Hgb   11.5   (11.5-16.5)  g/dL


 


Hct   38.3   (37.0-47.0)  %


 


MCV   91   (76-96)  fL


 


MCH   27.3   (27.0-32.0)  pg


 


MCHC   30.0 L   (31.0-35.0)  g/dL


 


RDW   15.5   (11.0-16.0)  %


 


Plt Count   323   (150-500)  K/uL


 


MPV   8.7   (6.0-10.0)  fL


 


Neut % (Auto)   83.7 H   (45.0-70.0)  %


 


Lymph % (Auto)   8.0 L   (20.0-40.0)  %


 


Mono % (Auto)   7.9   (3.0-10.0)  %


 


Eos % (Auto)   0.3 L   (1.0-5.0)  %


 


Baso % (Auto)   0.1   (0.0-0.5)  %


 


Neut # (Auto)   12.66 H   (2.00-7.50)  K/uL


 


Lymph # (Auto)   1.21 L   (1.50-4.00)  K/uL


 


Mono # (Auto)   1.19 H   (0.20-0.80)  K/uL


 


Eos # (Auto)   0.04   (0.04-0.40)  K/uL


 


Baso # (Auto)   0.01 L   (0.02-0.10)  K/uL


 


D-Dimer, Quantitative    910 H  (0-400)  ng/mL


 


Sodium     (136-145)  mmol/L


 


Potassium     (3.5-5.1)  mmol/L


 


Chloride     ()  mmol/L


 


Carbon Dioxide     (21.0-32.0)  mmol/L


 


Anion Gap     (5.0-15.0)  mmol/L


 


BUN     (8-26)  mg/dL


 


Creatinine     (0.55-1.02)  mg/dL


 


Est Cr Clr Drug Dosing     mL/min


 


Estimated GFR (MDRD)     (>60)  MLS/MIN


 


BUN/Creatinine Ratio     (6-25)  


 


Glucose     ()  mg/dL


 


Lactic Acid  2.0    (0.4-2.0)  mmol/L


 


Calcium     (8.5-10.1)  mg/dL


 


Total Bilirubin     (0.0-1.0)  mg/dL


 


AST     (15-37)  U/L


 


ALT     (12-78)  U/L


 


Alkaline Phosphatase     ()  U/L


 


C-Reactive Protein     (0.0-3.0)  mg/L


 


Total Protein     (6.4-8.2)  g/dL


 


Albumin     (3.4-5.0)  g/dL


 


Globulin     (2.2-4.2)  g/dL


 


Albumin/Globulin Ratio     (0.8-2.0)  














  03/09/21 03/09/21 03/09/21 Range/Units





  10:20 10:20 10:20 


 


WBC     (4.0-11.0)  K/uL


 


RBC     (3.80-5.80)  M/uL


 


Hgb     (11.5-16.5)  g/dL


 


Hct     (37.0-47.0)  %


 


MCV     (76-96)  fL


 


MCH     (27.0-32.0)  pg


 


MCHC     (31.0-35.0)  g/dL


 


RDW     (11.0-16.0)  %


 


Plt Count     (150-500)  K/uL


 


MPV     (6.0-10.0)  fL


 


Neut % (Auto)     (45.0-70.0)  %


 


Lymph % (Auto)     (20.0-40.0)  %


 


Mono % (Auto)     (3.0-10.0)  %


 


Eos % (Auto)     (1.0-5.0)  %


 


Baso % (Auto)     (0.0-0.5)  %


 


Neut # (Auto)     (2.00-7.50)  K/uL


 


Lymph # (Auto)     (1.50-4.00)  K/uL


 


Mono # (Auto)     (0.20-0.80)  K/uL


 


Eos # (Auto)     (0.04-0.40)  K/uL


 


Baso # (Auto)     (0.02-0.10)  K/uL


 


D-Dimer, Quantitative     (0-400)  ng/mL


 


Sodium    140  (136-145)  mmol/L


 


Potassium    3.9  (3.5-5.1)  mmol/L


 


Chloride    103  ()  mmol/L


 


Carbon Dioxide    28.2  (21.0-32.0)  mmol/L


 


Anion Gap    12.7  (5.0-15.0)  mmol/L


 


BUN    27 H  (8-26)  mg/dL


 


Creatinine    1.18 H  (0.55-1.02)  mg/dL


 


Est Cr Clr Drug Dosing    26.57  mL/min


 


Estimated GFR (MDRD)    43 L  (>60)  MLS/MIN


 


BUN/Creatinine Ratio    22.9  (6-25)  


 


Glucose    104 H  ()  mg/dL


 


Lactic Acid  2.8 H    (0.4-2.0)  mmol/L


 


Calcium    9.0  (8.5-10.1)  mg/dL


 


Total Bilirubin    0.2  D  (0.0-1.0)  mg/dL


 


AST    16  (15-37)  U/L


 


ALT    12  (12-78)  U/L


 


Alkaline Phosphatase    96  ()  U/L


 


C-Reactive Protein   55.7 H   (0.0-3.0)  mg/L


 


Total Protein    7.7  (6.4-8.2)  g/dL


 


Albumin    3.2 L  (3.4-5.0)  g/dL


 


Globulin    4.5 H  (2.2-4.2)  g/dL


 


Albumin/Globulin Ratio    0.7 L  (0.8-2.0)  











Result Diagrams: 


                                 03/09/21 08:10





                                 03/09/21 10:20





Sepsis Event Note





- Evaluation


Sepsis Screening Result: No Definite Risk


Current Stage of Sepsis: Ruled Out


Reason for Ruling Out Sepsis: 





Based off trending labs and clinical presentation. 





- Focused Exam


Vital Signs: 


                                   Vital Signs











  Temp Pulse Pulse Resp BP BP Pulse Ox


 


 03/09/21 08:54   89    153/76 H  


 


 03/09/21 08:00  36.6 C   89  18   153/76 H  94 L














- Problem List & Annotations


(1) Emphysema lung


SNOMED Code(s): 14631588


   Code(s): J43.9 - EMPHYSEMA, UNSPECIFIED   Status: Chronic   Priority: Low   

Current Visit: Yes   


Qualifiers: 


   Emphysema type: centrilobular   Qualified Code(s): J43.2 - Centrilobular 

emphysema   





(2) UTI (urinary tract infection)


SNOMED Code(s): 05377336


   Code(s): N39.0 - URINARY TRACT INFECTION, SITE NOT SPECIFIED   Status: Acute 

 Priority: Medium   Current Visit: Yes   


Qualifiers: 


   Urinary tract infection type: acute cystitis 





- Problem List Review


Problem List Initiated/Reviewed/Updated: Yes





- My Orders


Last 24 Hours: 


My Active Orders





03/08/21 10:52


Ready for Discharge [RC] PER UNIT ROUTINE 





03/08/21 20:00


atorvaSTATin [Lipitor]   10 mg PO BEDTIME 





03/09/21 10:52


REFLEX LACTIC ACID YES OR NO [CHEM] Routine 





03/09/21 Lunch


Heart Healthy Diet [DIET] 





03/10/21 16:57


COMPREHENSIVE METABOLIC PN,CMP [CHEM] DAILY 














- Assessment


Assessment:: 





UTI- Give IM 1 G Rocephin before DC. Then continue w OP therapy Levaquin 750mg 

PO q 48. 





- Plan


Plan:: 


Assessment-


UTI, chronic emphysema. 





Plan-


UTI- DC on Levaquin 750 q 48hrs PO for 3 days. Increase oral fluids. 











COPD-Continue back on Advair with Spiriva. 


NC 3-4L of O2 at home.


Medrol dose pack for OP use, started in hospital.

## 2021-05-24 NOTE — CR
DATE OF SERVICE:  05/24/20201

CLINICAL DATA:  SOB 



AP CHEST:



Comparison is made to a prior exam dated 8 March 2021.  



The heart size is stable.  There is calcification of the aortic arch.  



There is a gas containing mass posterior to the heart consistent with a large 
hiatal hernia.  



There is persistent pulmonary vascular congestion and mild interstitial edema in
both lungs consistent with congestive failure.  There is a linear density in the
right lung base consistent with linear atelectasis or fibrosis.  



The lungs are otherwise clear.  No pneumothorax.  No pleural effusions.  



[id number]

MTDD

## 2021-05-24 NOTE — EDM.PDOC
ED HPI GENERAL MEDICAL PROBLEM





- General


Chief Complaint: Cardiovascular Problem


Stated Complaint: SHORTNESS OF BREATH


Time Seen by Provider: 05/24/21 10:10


Source of Information: Reports: Patient, Nursing Home Records


History Limitations: Reports: No Limitations





- History of Present Illness


INITIAL COMMENTS - FREE TEXT/NARRATIVE: 





patient from the NH - was brought to the ER due to increasing SOB.





no CP or fever. 





h/o CHF, on lasix. Per nursing report, she 2 weeks ago, her lasix was stopped 

for unknown reason,. then when she saw her cardiologist a week ago, he started 

her back on lasix 20mg due to concerns for volume overload. She has been on/off 

her lasix since, and over the last 3 days, she has been c/o progressive SOB, 

mainly on exertion.





Patient is on chronic O2 use due to COPD. No fever or chills. no abd pain. Mild 

b/l legs edema. 








Onset: Gradual


Duration: Day(s): (3)


Location: Reports: Chest





- Related Data


                                    Allergies











Allergy/AdvReac Type Severity Reaction Status Date / Time


 


amoxicillin Allergy  Cannot Verified 03/07/21 14:25





   Remember  


 


Penicillins Allergy  Cannot Verified 03/07/21 14:25





   Remember  











Home Meds: 


                                    Home Meds





Acetaminophen [Tylenol Arthritis] 1 tab PO BID 01/14/17 [History]


Aspirin [Melchor Chewable Aspirin] 81 mg PO DAILY 01/14/17 [History]


atorvaSTATin [Lipitor] 10 mg PO BEDTIME 01/03/21 [History]


Fluticasone/Salmeterol [Advair 100-50] 1 puff IH BID 30 Days #1 diskus 01/05/21 

[Rx]


Tiotropium [Spiriva HandiHaler] 18 mcg INH DAILY #30 cap 01/05/21 [Rx]


Albuterol/Ipratropium [DuoNeb 3.0-0.5 MG/3 ML] 1 vial INH Q4H 03/07/21 [History]


Calcium Carbonate [Calcium] 1 tab PO DAILY 03/07/21 [History]


Fluticasone Propionate [Flonase] 2 spray INH DAILY 03/07/21 [History]


Loratadine [Claritin] 1 tab PO DAILY 03/07/21 [History]


Meclizine [Antivert] 12.5 mg PO Q24H PRN 03/07/21 [History]


Pantoprazole Sodium [Protonix] 1 tab PO DAILY 03/07/21 [History]


atenoloL [Atenolol] 0.5 tab PO DAILY 03/07/21 [History]


Furosemide [Lasix] 20 mg PO DAILY #30 tablet 03/08/21 [Rx]


levoFLOXacin [Levaquin] 750 mg PO Q48H #3 tab 03/08/21 [Rx]


methylPREDNISolone [Medrol Dose Pack] 4 mg PO DAILY #21 dospk 03/08/21 [Rx]











Past Medical History


HEENT History: Reports: Allergic Rhinitis, Cataract, Impaired Vision, Macular 

Degeneration


Cardiovascular History: Reports: High Cholesterol, Hypertension


Other Cardiovascular History: 3cm aortic bulging.


Respiratory History: Reports: Asthma, Bronchitis, Recurrent, COPD, Pneumonia, 

Recurrent


Gastrointestinal History: Reports: GERD, Hemorrhoids, Hiatal Hernia


Genitourinary History: Reports: UTI, Recurrent


OB/GYN History: Reports: Pregnancy


Musculoskeletal History: Reports: Arthritis, Back Pain, Chronic, Fracture, 

Osteoarthritis





- Infectious Disease History


Infectious Disease History: Reports: C-Difficile, Chicken Pox, Measles, Mumps, 

Shingles





- Past Surgical History


HEENT Surgical History: Reports: Cataract Surgery, Radiocarotid Ectomy


Cardiovascular Surgical History: Reports: Carotid Endarterectomy


Respiratory Surgical History: Reports: None


GI Surgical History: Reports: Colonoscopy


Musculoskeletal Surgical History: Reports: Other (See Below)


Other Musculoskeletal Surgeries/Procedures:: PLATE IN RIGHT ANKLE





Social & Family History





- Family History


Family Medical History: No Pertinent Family History


OBGYN: Reports: None


Neurological: Reports: None


Psychiatric: Reports: None


Endocrine/Metabolic: Reports: None


Hematologic: Reports: None


Oncologic: Reports: Breast





- Caffeine Use


Caffeine Use: Reports: Coffee





ED ROS GENERAL





- Review of Systems


Review Of Systems: See Below


Constitutional: Reports: No Symptoms.  Denies: Fever, Chills


HEENT: Reports: No Symptoms


Respiratory: Reports: Shortness of Breath


Cardiovascular: Reports: Dyspnea on Exertion


GI/Abdominal: Reports: No Symptoms


: Reports: No Symptoms


Musculoskeletal: Reports: No Symptoms


Skin: Reports: No Symptoms


Neurological: Reports: No Symptoms


Psychiatric: Reports: No Symptoms





ED EXAM, GENERAL





- Physical Exam


Exam: See Below


Exam Limited By: No Limitations


General Appearance: Alert, WD/WN, No Apparent Distress


Eye Exam: Bilateral Eye: EOMI


Head: Atraumatic


Respiratory/Chest: No Respiratory Distress, Normal Breath Sounds, No Accessory 

Muscle Use, Chest Non-Tender, Rales (b/l basal fine rales)


Cardiovascular: Normal Peripheral Pulses, Regular Rate, Rhythm


GI/Abdominal: Normal Bowel Sounds


Back Exam: Normal Inspection


Extremities: Normal Inspection


Neurological: Alert, Oriented, No Motor/Sensory Deficits


  ** #1 Interpretation


EKG Date: 05/24/21


Rhythm: NSR


Axis: Normal


P-Wave: Present


QRS: Normal


ST-T: Normal





Course





- Vital Signs


Last Recorded V/S: 


                                Last Vital Signs











Temp  36.7 C   05/24/21 10:15


 


Pulse  77   05/24/21 11:53


 


Resp  22 H  05/24/21 11:53


 


BP  106/52 L  05/24/21 10:15


 


Pulse Ox  95   05/24/21 11:53














- Orders/Labs/Meds


Orders: 


                               Active Orders 24 hr











 Category Date Time Status


 


 EKG Documentation Completion [RC] ASDIRECTED Care  05/24/21 10:13 Active


 


 Chest 1V Frontal [CR] Stat Exams  05/24/21 10:13 Taken


 


 Sodium Chloride 0.9% [Saline Flush] Med  05/24/21 10:13 Active





 10 ml FLUSH ASDIRECTED PRN   


 


 Saline Lock Insert [OM.PC] Routine Oth  05/24/21 10:13 Ordered


 


 EKG 12 Lead [EK] Routine Ther  05/24/21 10:13 Ordered








                                Medication Orders





Sodium Chloride (Sodium Chloride 0.9% 10 Ml Syringe)  10 ml FLUSH ASDIRECTED PRN


   PRN Reason: Keep Vein Open








Labs: 


                                Laboratory Tests











  05/24/21 05/24/21 Range/Units





  10:20 10:20 


 


WBC  9.1  D   (4.0-11.0)  K/uL


 


RBC  4.19   (3.80-5.80)  M/uL


 


Hgb  11.3 L   (11.5-16.5)  g/dL


 


Hct  37.8   (37.0-47.0)  %


 


MCV  90   (76-96)  fL


 


MCH  27.0   (27.0-32.0)  pg


 


MCHC  29.9 L   (31.0-35.0)  g/dL


 


RDW  17.7 H   (11.0-16.0)  %


 


Plt Count  302   (150-500)  K/uL


 


MPV  8.5   (6.0-10.0)  fL


 


Sodium   140  (136-145)  mmol/L


 


Potassium   5.1  (3.5-5.1)  mmol/L


 


Chloride   102  ()  mmol/L


 


Carbon Dioxide   27.0  (21.0-32.0)  mmol/L


 


Anion Gap   16.1 H  (5.0-15.0)  mmol/L


 


BUN   25  (8-26)  mg/dL


 


Creatinine   1.23 H D  (0.55-1.02)  mg/dL


 


Est Cr Clr Drug Dosing   TNP  


 


Estimated GFR (MDRD)   41 L  (>60)  MLS/MIN


 


BUN/Creatinine Ratio   20.3  (6-25)  


 


Glucose   124 H  ()  mg/dL


 


Calcium   9.0  (8.5-10.1)  mg/dL


 


Troponin I   < 0.017  (0.000-0.060)  ng/mL


 


B-Natriuretic Peptide   8354 H D  (0-450)  pg/mL











Meds: 


Medications











Generic Name Dose Route Start Last Admin





  Trade Name Freq  PRN Reason Stop Dose Admin


 


Sodium Chloride  10 ml  05/24/21 10:13 





  Sodium Chloride 0.9% 10 Ml Syringe  FLUSH  





  ASDIRECTED PRN  





  Keep Vein Open  














Discontinued Medications














Generic Name Dose Route Start Last Admin





  Trade Name Freq  PRN Reason Stop Dose Admin


 


Furosemide  20 mg  05/24/21 10:57  05/24/21 11:10





  Furosemide 40 Mg/4 Ml Vial  IVPUSH  05/24/21 10:58  20 mg





  NOW ONE   Administration














- Re-Assessments/Exams


Free Text/Narrative Re-Assessment/Exam: 





vitals WNL





CXR - no infiltrates, but mild perihilar congestion.





labs - no leukocytosis, but significant for elevation in BNP.


normal trop





IV lasix 20mg was given - patient has a large UOP.





Reports that she is breathing easier than before, No increase in O2 needs.








Departure





- Departure


Time of Disposition: 12:45


Disposition: Home, Self-Care 01


Condition: Good


Clinical Impression: 


 Volume overload state of heart





Pulmonary edema


Qualifiers:


 Chronicity: acute Qualified Code(s): J81.0 - Acute pulmonary edema





Forms:  ED Department Discharge





Sepsis Event Note (ED)





- Focused Exam


Vital Signs: 


                                   Vital Signs











  Temp Pulse Resp BP Pulse Ox


 


 05/24/21 11:53   77  22 H   95


 


 05/24/21 10:15  36.7 C  88  20  106/52 L  99














- Problem List & Annotations


(1) Pulmonary edema


SNOMED Code(s): 75088697


   Code(s): J81.1 - CHRONIC PULMONARY EDEMA   Status: Acute   Priority: Medium  

 Current Visit: Yes   


Qualifiers: 


   Chronicity: acute   Qualified Code(s): J81.0 - Acute pulmonary edema   





(2) Volume overload state of heart


SNOMED Code(s): 59003408


   Code(s): E87.79 - OTHER FLUID OVERLOAD   Status: Acute   Priority: Low   

Current Visit: Yes   





- Problem List Review


Problem List Initiated/Reviewed/Updated: Yes





- My Orders


Last 24 Hours: 


My Active Orders





05/24/21 10:13


EKG Documentation Completion [RC] ASDIRECTED 


Chest 1V Frontal [CR] Stat 


Sodium Chloride 0.9% [Saline Flush]   10 ml FLUSH ASDIRECTED PRN 


Saline Lock Insert [OM.PC] Routine 


EKG 12 Lead [EK] Routine 














- Assessment/Plan


Last 24 Hours: 


My Active Orders





05/24/21 10:13


EKG Documentation Completion [RC] ASDIRECTED 


Chest 1V Frontal [CR] Stat 


Sodium Chloride 0.9% [Saline Flush]   10 ml FLUSH ASDIRECTED PRN 


Saline Lock Insert [OM.PC] Routine 


EKG 12 Lead [EK] Routine 











Plan: 





- increase lasix PO to 40mg daily for 3 days, then back to 20mg daily





- daily weight check





- follow up with the PCP in 5-10 days for recheck up





- return to the ER if symptoms got worse or any concerns

## 2021-07-05 NOTE — CR
Date of Service:  07/05/21

Clinical Data:  SOB



AP CHEST:  



Comparison is made to a prior exam dated 05/24/21.  



The heart size is stable.  The pulmonary vasculature remains mildly prominent.  



There is persistent eventration of the right hemidiaphragm and persistent 
atelectatic/fibrotic changes in the right lung base.  There are scattered 
reticular opacities throughout both lungs,  



No pneumothorax.  No pleural effusions.  



[id number]

[facility]

## 2021-07-05 NOTE — EDM.PDOC
ED HPI GENERAL MEDICAL PROBLEM





- General


Chief Complaint: Cardiovascular Problem


Stated Complaint: SHORT OF BREATH


Time Seen by Provider: 07/05/21 12:01


Source of Information: Reports: Patient


History Limitations: Reports: No Limitations





- History of Present Illness


INITIAL COMMENTS - FREE TEXT/NARRATIVE: 





patient with a h/o end stage COPD and CHF - on continuos o2 use 5 lit nc, who 

presented to the ER with a c/o SOB. 





no fever or chills. no CP.  Reports it started this morning and didn't improve 

with neb treatment.





no abd pain or GI symptoms. 





Her O2 at the NH was mid 80's.








- Related Data


                                    Allergies











Allergy/AdvReac Type Severity Reaction Status Date / Time


 


amoxicillin Allergy  Cannot Verified 03/07/21 14:25





   Remember  


 


Penicillins Allergy  Cannot Verified 03/07/21 14:25





   Remember  











Home Meds: 


                                    Home Meds





Acetaminophen [Tylenol Arthritis] 1 tab PO BID 01/14/17 [History]


Aspirin [Melchor Chewable Aspirin] 81 mg PO DAILY 01/14/17 [History]


atorvaSTATin [Lipitor] 10 mg PO BEDTIME 01/03/21 [History]


Fluticasone/Salmeterol [Advair 100-50] 1 puff IH BID 30 Days #1 diskus 01/05/21 

[Rx]


Tiotropium [Spiriva HandiHaler] 18 mcg INH DAILY #30 cap 01/05/21 [Rx]


Albuterol/Ipratropium [DuoNeb 3.0-0.5 MG/3 ML] 1 vial INH Q4H 03/07/21 [History]


Calcium Carbonate [Calcium] 1 tab PO DAILY 03/07/21 [History]


Fluticasone Propionate [Flonase] 2 spray INH DAILY 03/07/21 [History]


Loratadine [Claritin] 1 tab PO DAILY 03/07/21 [History]


Meclizine [Antivert] 12.5 mg PO Q24H PRN 03/07/21 [History]


Pantoprazole Sodium [Protonix] 1 tab PO DAILY 03/07/21 [History]


atenoloL [Atenolol] 0.5 tab PO DAILY 03/07/21 [History]


Furosemide [Lasix] 20 mg PO DAILY #30 tablet 03/08/21 [Rx]


levoFLOXacin [Levaquin] 750 mg PO Q48H #3 tab 03/08/21 [Rx]


methylPREDNISolone [Medrol Dose Pack] 4 mg PO DAILY #21 dospk 03/08/21 [Rx]











Past Medical History


HEENT History: Reports: Allergic Rhinitis, Cataract, Impaired Vision, Macular 

Degeneration


Cardiovascular History: Reports: Heart Failure, High Cholesterol, Hypertension


Other Cardiovascular History: 3cm aortic bulging.


Respiratory History: Reports: Asthma, Bronchitis, Recurrent, COPD, Pneumonia, 

Recurrent


Gastrointestinal History: Reports: GERD, Hemorrhoids, Hiatal Hernia


Genitourinary History: Reports: UTI, Recurrent


OB/GYN History: Reports: Pregnancy


Musculoskeletal History: Reports: Arthritis, Back Pain, Chronic, Fracture, 

Osteoarthritis





- Infectious Disease History


Infectious Disease History: Reports: C-Difficile, Chicken Pox, Measles, Mumps, 

Shingles





- Past Surgical History


HEENT Surgical History: Reports: Cataract Surgery, Radiocarotid Ectomy


Cardiovascular Surgical History: Reports: Carotid Endarterectomy


Respiratory Surgical History: Reports: None


GI Surgical History: Reports: Colonoscopy


Musculoskeletal Surgical History: Reports: Other (See Below)


Other Musculoskeletal Surgeries/Procedures:: PLATE IN RIGHT ANKLE





Social & Family History





- Family History


Family Medical History: No Pertinent Family History


OBGYN: Reports: None


Neurological: Reports: None


Psychiatric: Reports: None


Endocrine/Metabolic: Reports: None


Hematologic: Reports: None


Oncologic: Reports: Breast





- Caffeine Use


Caffeine Use: Reports: Coffee





ED ROS GENERAL





- Review of Systems


Review Of Systems: See Below


Constitutional: Reports: No Symptoms


HEENT: Reports: No Symptoms


Respiratory: Reports: Shortness of Breath


Cardiovascular: Reports: No Symptoms


GI/Abdominal: Reports: No Symptoms


: Reports: No Symptoms


Musculoskeletal: Reports: No Symptoms


Skin: Reports: No Symptoms


Neurological: Reports: No Symptoms





ED EXAM, GENERAL





- Physical Exam


Exam: See Below


Exam Limited By: No Limitations


General Appearance: Alert, WD/WN, Anxious, Mild Distress


Eye Exam: Bilateral Eye: EOMI, PERRL


Head: Atraumatic


Respiratory/Chest: No Respiratory Distress, Lungs Clear, Normal Breath Sounds


Cardiovascular: Normal Peripheral Pulses, Tachycardia


GI/Abdominal: Normal Bowel Sounds, Soft, Non-Tender


Back Exam: Normal Inspection


Extremities: Normal Inspection, Normal Range of Motion


Neurological: Alert, Oriented, No Motor/Sensory Deficits


Skin Exam: Warm, Dry


  ** #1 Interpretation


EKG Date: 07/05/21


Rhythm: NSR


Rate (Beats/Min): 101


Axis: Normal


P-Wave: Present


QRS: Normal


ST-T: Normal


QT: Normal





Course





- Vital Signs


Last Recorded V/S: 


                                Last Vital Signs











Temp      


 


Pulse  100   07/05/21 15:53


 


Resp  18   07/05/21 15:41


 


BP  128/68   07/05/21 15:53


 


Pulse Ox  94 L  07/05/21 15:41














- Orders/Labs/Meds


Orders: 


                               Active Orders 24 hr











 Category Date Time Status


 


 EKG Documentation Completion [RC] ASDIRECTED Care  07/05/21 12:01 Active


 


 RT Aerosol Therapy [RC] ASDIRECTED Care  07/05/21 12:01 Active


 


 RT Aerosol Therapy [RC] ASDIRECTED Care  07/05/21 12:49 Active


 


 Sodium Chloride 0.9% [Saline Flush] Med  07/05/21 12:00 Active





 10 ml FLUSH ASDIRECTED PRN   


 


 Saline Lock Insert [OM.PC] Routine Oth  07/05/21 12:00 Ordered








                                Medication Orders





Sodium Chloride (Sodium Chloride 0.9% 10 Ml Syringe)  10 ml FLUSH ASDIRECTED PRN


   PRN Reason: Keep Vein Open








Labs: 


                                Laboratory Tests











  07/05/21 07/05/21 Range/Units





  12:04 12:04 


 


WBC  11.6 H D   (4.0-11.0)  K/uL


 


RBC  4.32   (3.80-5.80)  M/uL


 


Hgb  11.4 L   (11.5-16.5)  g/dL


 


Hct  39.1   (37.0-47.0)  %


 


MCV  91   (76-96)  fL


 


MCH  26.4 L   (27.0-32.0)  pg


 


MCHC  29.2 L   (31.0-35.0)  g/dL


 


RDW  18.7 H   (11.0-16.0)  %


 


Plt Count  326  D   (150-500)  K/uL


 


MPV  8.6   (6.0-10.0)  fL


 


Sodium   138  (136-145)  mmol/L


 


Potassium   5.0  (3.5-5.1)  mmol/L


 


Chloride   100  ()  mmol/L


 


Carbon Dioxide   20.3 L D  (21.0-32.0)  mmol/L


 


Anion Gap   22.7 H  (5.0-15.0)  mmol/L


 


BUN   29 H  (8-26)  mg/dL


 


Creatinine   1.63 H D  (0.55-1.02)  mg/dL


 


Est Cr Clr Drug Dosing   TNP  


 


Estimated GFR (MDRD)   30 L  (>60)  MLS/MIN


 


BUN/Creatinine Ratio   17.8  (6-25)  


 


Glucose   205 H D  ()  mg/dL


 


Calcium   9.2  (8.5-10.1)  mg/dL


 


B-Natriuretic Peptide   82904 H D  (0-450)  pg/mL











Meds: 


Medications











Generic Name Dose Route Start Last Admin





  Trade Name Freq  PRN Reason Stop Dose Admin


 


Sodium Chloride  10 ml  07/05/21 12:00 





  Sodium Chloride 0.9% 10 Ml Syringe  FLUSH  





  ASDIRECTED PRN  





  Keep Vein Open  














Discontinued Medications














Generic Name Dose Route Start Last Admin





  Trade Name Freq  PRN Reason Stop Dose Admin


 


Albuterol  2.5 mg  07/05/21 12:01  07/05/21 12:01





  Albuterol 0.083% 2.5 Mg/3 Ml [facility]  07/05/21 12:02  2.5 mg





  ONETIME ONE   Administration


 


Albuterol  2.5 mg  07/05/21 12:48  07/05/21 12:48





  Albuterol 0.083% 2.5 Mg/3 Ml [facility]  07/05/21 12:49  2.5 mg





  ONETIME ONE   Administration


 


Atenolol  12.5 mg  07/05/21 15:49  07/05/21 15:53





  Atenolol 25 Mg Tab  PO  07/05/21 15:50  12.5 mg





  ONETIME ONE   Administration


 


Furosemide  10 mg  07/05/21 13:13  07/05/21 13:15





  Furosemide 40 Mg/4 Ml Vial  IVPUSH  07/05/21 13:14  10 mg





  NOW ONE   Administration


 


Furosemide  20 mg  07/05/21 14:58  07/05/21 15:00





  Furosemide 20 Mg Tab  PO  07/05/21 14:59  20 mg





  ONETIME ONE   Administration


 


Furosemide  Confirm  07/05/21 14:57  07/05/21 15:03





  Furosemide 20 Mg Tab  Administered  07/05/21 14:58  Not Given





  Dose  





  20 mg  





  .ROUTE  





  .STK-MED ONE  














- Re-Assessments/Exams


Free Text/Narrative Re-Assessment/Exam: 





was sating in mid 80's on 5 lit NC





duoneb was given - helped with symptoms





CXR - no infiltrates or effusion 





labs were ordered - significant elevation in BNP > 15k. 





IV lasix 20 mg was given - only 50ml UOP --





slightly tachycardia to 105 pbm





atenolol 12.5 mg PO was given 





symptoms seems to be triggered by COPD and slight anxiety. 





heart failure seems to be getting worse but no e/o fluids overload today. 





discussed with patient and her family - her clinical presentation and labs 

findings - she understand that her heart is failing and nothing much to do bes

barbara taking her meds and managing it on a day by day basis. She accept the fast 

that her symptoms are terminal. 








Departure





- Departure


Time of Disposition: 16:48


Disposition: Home, Self-Care 01


Condition: Good


Clinical Impression: 


 COPD exacerbation, Sinus tachycardia





Heart failure


Qualifiers:


 Heart failure type: combined systolic and diastolic Heart failure chronicity: 

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 

diastolic (congestive) heart failure








- Discharge Information


*PRESCRIPTION DRUG MONITORING PROGRAM REVIEWED*: Not Applicable


*COPY OF PRESCRIPTION DRUG MONITORING REPORT IN PATIENT HEMAL: Not Applicable


Instructions:  Chronic Obstructive Pulmonary Disease, Easy-to-Read, Heart 

Failure Action Plan


Referrals: 


PCP,None [Ordering Only Provider] - 


Forms:  ED Department Discharge





Sepsis Event Note (ED)





- Focused Exam


Vital Signs: 


                                   Vital Signs











  Pulse Pulse Resp BP BP Pulse Ox


 


 07/05/21 15:53  100    128/68  


 


 07/05/21 15:41   94  18   128/67  94 L


 


 07/05/21 15:21       93 L


 


 07/05/21 15:14   102 H  20   126/66  97


 


 07/05/21 15:03   100  16   126/66  94 L


 


 07/05/21 14:58   104 H  18    98


 


 07/05/21 13:59   100  22 H   106/59 L  94 L


 


 07/05/21 13:31   99  22 H   101/56 L  96


 


 07/05/21 12:49      101/56 L 


 


 07/05/21 12:30   97  22 H   94/46 L  98


 


 07/05/21 12:15   111 H  20   106/49 L  97


 


 07/05/21 12:04   111 H  20   96/52 L  98














- Problem List & Annotations


(1) COPD exacerbation


SNOMED Code(s): 183173100


   Code(s): J44.1 - CHRONIC OBSTRUCTIVE PULMONARY DISEASE W (ACUTE) EXACERBATION

  Status: Acute   Priority: Medium   Current Visit: Yes   





(2) Heart failure


SNOMED Code(s): 25264458


   Code(s): I50.9 - HEART FAILURE, UNSPECIFIED   Status: Acute   Priority: 

Medium   Current Visit: Yes   


Qualifiers: 


   Heart failure type: combined systolic and diastolic   Heart failure 

chronicity: chronic   Qualified Code(s): I50.42 - Chronic combined systolic 

(congestive) and diastolic (congestive) heart failure   





(3) Sinus tachycardia


SNOMED Code(s): 81041365


   Code(s): R00.0 - TACHYCARDIA, UNSPECIFIED   Status: Acute   Priority: Low   

Current Visit: Yes   





- Problem List Review


Problem List Initiated/Reviewed/Updated: Yes





- My Orders


Last 24 Hours: 


My Active Orders





07/05/21 12:00


Sodium Chloride 0.9% [Saline Flush]   10 ml FLUSH ASDIRECTED PRN 


Saline Lock Insert [OM.PC] Routine 





07/05/21 12:01


EKG Documentation Completion [RC] ASDIRECTED 


RT Aerosol Therapy [RC] ASDIRECTED 





07/05/21 12:49


RT Aerosol Therapy [RC] ASDIRECTED 














- Assessment/Plan


Last 24 Hours: 


My Active Orders





07/05/21 12:00


Sodium Chloride 0.9% [Saline Flush]   10 ml FLUSH ASDIRECTED PRN 


Saline Lock Insert [OM.PC] Routine 





07/05/21 12:01


EKG Documentation Completion [RC] ASDIRECTED 


RT Aerosol Therapy [RC] ASDIRECTED 





07/05/21 12:49


RT Aerosol Therapy [RC] ASDIRECTED 











Plan: 





- continue home meds as before 





- the only change is regarding her atenolol -- continue with 1 tabs daily, 

unless her SBP <90,, also give 1 extra tablet if HR > 100.

## 2021-07-10 NOTE — EDM.PDOC
ED HPI GENERAL MEDICAL PROBLEM





- General


Chief Complaint: Cardiovascular Problem


Stated Complaint: syncope


Time Seen by Provider: 07/10/21 17:45


Source of Information: Reports: Family, Old Records, RN ([facility] staff)





- History of Present Illness


INITIAL COMMENTS - FREE TEXT/NARRATIVE: 





pt presents to ER accompanied by NH staff who state pt had syncopized while 

seated with family. family provides history that pt had intermittently felt 

"fine" throughout the day but just prior to syncopizing pt stated she was quite 

nauseated and had 1-2 episodes of dry heaves. history limited by 

unresponsiveness. no recent illnesses. 


chart review shows medical history of COPD, PNA, CHF, UTI


Onset: Today, Sudden


Onset Date: 07/10/21


Onset Time: 17:30





- Related Data


                                    Allergies











Allergy/AdvReac Type Severity Reaction Status Date / Time


 


amoxicillin Allergy  Cannot Verified 21 14:25





   Remember  


 


Penicillins Allergy  Cannot Verified 21 14:25





   Remember  











Home Meds: 


                                    Home Meds





Acetaminophen [Tylenol Arthritis] 1 tab PO BID 17 [History]


Aspirin [Melchor Chewable Aspirin] 81 mg PO DAILY 17 [History]


atorvaSTATin [Lipitor] 10 mg PO BEDTIME 21 [History]


Fluticasone/Salmeterol [Advair 100-50] 1 puff IH BID 30 Days #1 diskus 21 

[Rx]


Tiotropium [Spiriva HandiHaler] 18 mcg INH DAILY #30 cap 21 [Rx]


Albuterol/Ipratropium [DuoNeb 3.0-0.5 MG/3 ML] 1 vial INH Q4H 21 [History]


Calcium Carbonate [Calcium] 1 tab PO DAILY 21 [History]


Fluticasone Propionate [Flonase] 2 spray INH DAILY 21 [History]


Loratadine [Claritin] 1 tab PO DAILY 21 [History]


Meclizine [Antivert] 12.5 mg PO Q24H PRN 21 [History]


Pantoprazole Sodium [Protonix] 1 tab PO DAILY 21 [History]


atenoloL [Atenolol] 0.5 tab PO DAILY 21 [History]


Furosemide [Lasix] 20 mg PO DAILY #30 tablet 21 [Rx]


levoFLOXacin [Levaquin] 750 mg PO Q48H #3 tab 21 [Rx]


methylPREDNISolone [Medrol Dose Pack] 4 mg PO DAILY #21 dospk 21 [Rx]











Past Medical History


HEENT History: Reports: Allergic Rhinitis, Cataract, Impaired Vision, Macular 

Degeneration


Cardiovascular History: Reports: Heart Failure, High Cholesterol, Hypertension


Other Cardiovascular History: 3cm aortic bulging.


Respiratory History: Reports: Asthma, Bronchitis, Recurrent, COPD, Pneumonia, 

Recurrent


Gastrointestinal History: Reports: GERD, Hemorrhoids, Hiatal Hernia


Genitourinary History: Reports: UTI, Recurrent


OB/GYN History: Reports: Pregnancy


Musculoskeletal History: Reports: Arthritis, Back Pain, Chronic, Fracture, 

Osteoarthritis





- Infectious Disease History


Infectious Disease History: Reports: C-Difficile, Chicken Pox, Measles, Mumps, 

Shingles





- Past Surgical History


HEENT Surgical History: Reports: Cataract Surgery, Radiocarotid Ectomy


Cardiovascular Surgical History: Reports: Carotid Endarterectomy


Respiratory Surgical History: Reports: None


GI Surgical History: Reports: Colonoscopy


Musculoskeletal Surgical History: Reports: Other (See Below)


Other Musculoskeletal Surgeries/Procedures:: PLATE IN RIGHT ANKLE





Social & Family History





- Family History


Family Medical History: No Pertinent Family History


OBGYN: Reports: None


Neurological: Reports: None


Psychiatric: Reports: None


Endocrine/Metabolic: Reports: None


Hematologic: Reports: None


Oncologic: Reports: Breast





- Caffeine Use


Caffeine Use: Reports: Coffee





ED ROS GENERAL





- Review of Systems


Review Of Systems: See Below


Reason Not Obtained: pt unresponsive





ED EXAM, GENERAL





- Physical Exam


Exam: See Below


Exam Limited By: Altered Mental Status


General Appearance: Other (pallor, agonally breathing)


Head: Atraumatic, Normocephalic


Respiratory/Chest: Other (agonal breathing)


Cardiovascular: Other (no central or peripheral pulses noted.)


Peripheral Pulses: 0: Carotid (L), Carotid (R), Radial (L), Radial (R), 

Posterior Tibial (L), Posterior Tibial (R)


Neurological: Unresponsive


Skin Exam: Cyanosis, Pallor





Course





- Re-Assessments/Exams


Free Text/Narrative Re-Assessment/Exam: 





07/10/21 1755: pt on initial assessment pt was being placed in a gown by nursing

 staff, they stated pt had a syncopal episode while in the NH with family 

visiting. pt was noticeably agonally breathing and had no heart tones or lung 

sounds by auscultation. pt had no peripheral pulses and was significantly cold 

to extremities and pallor to her face. at family request and by NH documentation

 pt DNR and family requested no other testing. 


EKG was completed and showed asystole. 








Departure





- Departure


Time of Disposition: 18:00


Disposition:  20


Preliminary Cause of Death *Q: Cardiac Arrest


Clinical Impression: 


 Cardiac arrest due to underlying cardiac condition





Referrals: 


PCP,None [Primary Care Provider] - 





- Problem List & Annotations


(1) Cardiac arrest due to underlying cardiac condition


SNOMED Code(s): 271097098


   Code(s): I46.2 - CARDIAC ARREST DUE TO UNDERLYING CARDIAC CONDITION   Status:

Acute   Current Visit: Yes   





- Problem List Review


Problem List Initiated/Reviewed/Updated: Yes





- Assessment/Plan


Assessment:: 





assessment: cardiac arrest due to underlying cardiac problems


plan: pt is DNR by documentation and family request. pt  in ED in the 

company of son and .